# Patient Record
Sex: FEMALE | Employment: UNEMPLOYED | URBAN - METROPOLITAN AREA
[De-identification: names, ages, dates, MRNs, and addresses within clinical notes are randomized per-mention and may not be internally consistent; named-entity substitution may affect disease eponyms.]

---

## 2018-01-12 ENCOUNTER — OFFICE VISIT (OUTPATIENT)
Dept: NEUROLOGY | Age: 63
End: 2018-01-12

## 2018-01-12 VITALS
BODY MASS INDEX: 36.99 KG/M2 | DIASTOLIC BLOOD PRESSURE: 90 MMHG | WEIGHT: 222 LBS | RESPIRATION RATE: 16 BRPM | HEART RATE: 81 BPM | SYSTOLIC BLOOD PRESSURE: 180 MMHG | OXYGEN SATURATION: 99 % | HEIGHT: 65 IN

## 2018-01-12 DIAGNOSIS — F01.50 VASCULAR DEMENTIA WITHOUT BEHAVIORAL DISTURBANCE (HCC): ICD-10-CM

## 2018-01-12 DIAGNOSIS — I69.993 CVA, OLD, ATAXIA: Primary | ICD-10-CM

## 2018-01-12 DIAGNOSIS — F32.A DEPRESSION, UNSPECIFIED DEPRESSION TYPE: ICD-10-CM

## 2018-01-12 PROBLEM — F33.9 RECURRENT DEPRESSION (HCC): Status: ACTIVE | Noted: 2018-01-12

## 2018-01-12 RX ORDER — FUROSEMIDE 20 MG/1
25 TABLET ORAL DAILY
COMMUNITY
End: 2022-05-19

## 2018-01-12 RX ORDER — FLUOXETINE 10 MG/1
10 TABLET ORAL DAILY
Qty: 30 TAB | Refills: 1 | Status: SHIPPED | OUTPATIENT
Start: 2018-01-12 | End: 2018-09-24 | Stop reason: SDUPTHER

## 2018-01-12 RX ORDER — ASPIRIN 325 MG
325 TABLET ORAL DAILY
COMMUNITY

## 2018-01-12 RX ORDER — DONEPEZIL HYDROCHLORIDE 5 MG/1
5 TABLET, FILM COATED ORAL
Qty: 30 TAB | Refills: 0 | Status: SHIPPED | OUTPATIENT
Start: 2018-01-12 | End: 2018-02-06 | Stop reason: SDUPTHER

## 2018-01-12 RX ORDER — BISMUTH SUBSALICYLATE 262 MG
1 TABLET,CHEWABLE ORAL DAILY
COMMUNITY

## 2018-01-12 RX ORDER — TRAZODONE HYDROCHLORIDE 50 MG/1
TABLET ORAL
COMMUNITY
End: 2020-12-16

## 2018-01-12 RX ORDER — CARVEDILOL 25 MG/1
75 TABLET ORAL 2 TIMES DAILY WITH MEALS
COMMUNITY
End: 2022-05-19

## 2018-01-12 RX ORDER — ROSUVASTATIN CALCIUM 40 MG/1
75 TABLET, COATED ORAL
COMMUNITY
End: 2022-05-19

## 2018-01-12 RX ORDER — INSULIN ASPART 100 [IU]/ML
INJECTION, SOLUTION INTRAVENOUS; SUBCUTANEOUS
COMMUNITY

## 2018-01-12 RX ORDER — METFORMIN HYDROCHLORIDE 500 MG/1
1000 TABLET ORAL 2 TIMES DAILY WITH MEALS
COMMUNITY
End: 2022-05-19

## 2018-01-12 RX ORDER — INSULIN DEGLUDEC 100 U/ML
27 INJECTION, SOLUTION SUBCUTANEOUS DAILY
COMMUNITY
End: 2022-05-19

## 2018-01-12 NOTE — LETTER
1/12/2018 11:54 AM 
 
Patient:  Silvana Doss YOB: 1955 Date of Visit: 1/12/2018 Dear Henrietta Tinoco MD 
[address] [place] [address] VIA Facsimile: [phone] 
 : Thank you for referring Ms. Silvana Doss to me for evaluation/treatment. Below are the relevant portions of my assessment and plan of care. If you have questions, please do not hesitate to call me. I look forward to following Ms. Erick Varela along with you. Sincerely, Washington Jones MD

## 2018-01-12 NOTE — MR AVS SNAPSHOT
Visit Information Date & Time Provider Department Dept. Phone Encounter #  
 1/12/2018 10:00 AM Nilsa Zelaya MD [facility] at [address] [id number] Follow-up Instructions Return in about 6 months (around 7/12/2018). Upcoming Health Maintenance Date Due Hepatitis C Screening 1955 DTaP/Tdap/Td series (1 - Tdap) 12/2/1977 PAP AKA CERVICAL CYTOLOGY 12/2/1977 BREAST CANCER SCRN MAMMOGRAM 12/2/2006 FOBT Q 1 YEAR AGE 50-75 12/2/2006 ZOSTER VACCINE AGE 60> 10/2/2016 Influenza Age 5 to Adult 8/1/2017 Allergies as of 1/12/2018  Review Complete On: 1/12/2018 By: Nilsa Zelaya MD  
 No Known Allergies Current Immunizations  Never Reviewed No immunizations on file. Not reviewed this visit You Were Diagnosed With   
  
 Codes Comments CVA, old, ataxia    -  Primary ICD-10-CM: G84.402 ICD-9-CM: 438.84 Depression, unspecified depression type     ICD-10-CM: F32.9 ICD-9-CM: 050 Vascular dementia without behavioral disturbance     ICD-10-CM: F01.50 ICD-9-CM: 290.40 Vitals BP Pulse Resp Height(growth percentile) Weight(growth percentile) SpO2  
 180/90 81 16 5' 5\" (1.651 m) 222 lb (100.7 kg) 99% BMI OB Status Smoking Status 36.94 kg/m2 Postmenopausal Never Smoker Vitals History BMI and BSA Data Body Mass Index Body Surface Area  
 36.94 kg/m 2 2.15 m 2 Preferred Pharmacy Pharmacy Name Phone [facility] [address] [phone] Your Updated Medication List  
  
   
This list is accurate as of: 1/12/18 10:47 AM.  Always use your most recent med list.  
  
  
  
  
 aspirin 325 mg tablet Commonly known as:  ASPIRIN Take 325 mg by mouth daily. COREG 25 mg tablet Generic drug:  carvedilol Take 75 mg by mouth two (2) times daily (with meals). CRESTOR 40 mg tablet Generic drug:  rosuvastatin Take 75 mg by mouth nightly. donepezil 5 mg tablet Commonly known as:  ARICEPT Take 1 Tab by mouth nightly. FLUoxetine 10 mg tablet Commonly known as:  PROzac Take 1 Tab by mouth daily. LASIX 20 mg tablet Generic drug:  furosemide Take 25 mg by mouth daily. metFORMIN 500 mg tablet Commonly known as:  GLUCOPHAGE Take 1,000 mg by mouth two (2) times daily (with meals). multivitamin tablet Commonly known as:  ONE A DAY Take 1 Tab by mouth daily. NovoLOG 100 unit/mL injection Generic drug:  insulin aspart  
by SubCUTAneous route. potassium 99 mg tablet Take 99 mg by mouth daily. traZODone 50 mg tablet Commonly known as:  Gladystine Gemma Take  by mouth nightly. TRESIBA FLEXTOUCH U-100 100 unit/mL (3 mL) Inpn Generic drug:  insulin degludec  
by SubCUTAneous route. Prescriptions Sent to Pharmacy Refills  
 donepezil (ARICEPT) 5 mg tablet 0 Sig: Take 1 Tab by mouth nightly. Class: Normal  
 Pharmacy: [facility] GUADALUPE TRUJILLO [address] [place] [address] Ph #: [phone] Route: Oral  
 FLUoxetine (PROZAC) 10 mg tablet 1 Sig: Take 1 Tab by mouth daily. Class: Normal  
 Pharmacy: [facility] MAR[facility] GUADALUPE TRUJILLO [address] [place] [address] Ph #: [phone] Route: Oral  
  
We Performed the Following REFERRAL TO PHYSICAL THERAPY [ZTO17 Custom] Comments:  
 Please evaluate patient for residual ataxia from old stroke Follow-up Instructions Return in about 6 months (around 7/12/2018). Referral Information Referral ID Referred By Referred To  
  
 [id number] Nazanin MATUTE Not Available Visits Status Start Date End Date 1 Authorized 1/12/18 1/12/19 If your referral has a status of pending review or denied, additional information will be sent to support the outcome of this decision. Patient Instructions A Healthy Lifestyle: Care Instructions Your Care Instructions A healthy lifestyle can help you feel good, stay at a healthy weight, and have plenty of energy for both work and play. A healthy lifestyle is something you can share with your whole family. A healthy lifestyle also can lower your risk for serious health problems, such as high blood pressure, heart disease, and diabetes. You can follow a few steps listed below to improve your health and the health of your family. Follow-up care is a key part of your treatment and safety. Be sure to make and go to all appointments, and call your doctor if you are having problems. It's also a good idea to know your test results and keep a list of the medicines you take. How can you care for yourself at home? · Do not eat too much sugar, fat, or fast foods. You can still have dessert and treats now and then. The goal is moderation. · Start small to improve your eating habits. Pay attention to portion sizes, drink less juice and soda pop, and eat more fruits and vegetables. ¨ Eat a healthy amount of food. A 3-ounce serving of meat, for example, is about the size of a deck of cards. Fill the rest of your plate with vegetables and whole grains. ¨ Limit the amount of soda and sports drinks you have every day. Drink more water when you are thirsty. ¨ Eat at least 5 servings of fruits and vegetables every day. It may seem like a lot, but it is not hard to reach this goal. A serving or helping is 1 piece of fruit, 1 cup of vegetables, or 2 cups of leafy, raw vegetables. Have an apple or some carrot sticks as an afternoon snack instead of a candy bar. Try to have fruits and/or vegetables at every meal. 
· Make exercise part of your daily routine. You may want to start with simple activities, such as walking, bicycling, or slow swimming. Try to be active 30 to 60 minutes every day.  You do not need to do all 30 to 60 minutes all at once. For example, you can exercise 3 times a day for 10 or 20 minutes. Moderate exercise is safe for most people, but it is always a good idea to talk to your doctor before starting an exercise program. 
· Keep moving. Martha Varelaint the lawn, work in the garden, or meQuilibrium. Take the stairs instead of the elevator at work. · If you smoke, quit. People who smoke have an increased risk for heart attack, stroke, cancer, and other lung illnesses. Quitting is hard, but there are ways to boost your chance of quitting tobacco for good. ¨ Use nicotine gum, patches, or lozenges. ¨ Ask your doctor about stop-smoking programs and medicines. ¨ Keep trying. In addition to reducing your risk of diseases in the future, you will notice some benefits soon after you stop using tobacco. If you have shortness of breath or asthma symptoms, they will likely get better within a few weeks after you quit. · Limit how much alcohol you drink. Moderate amounts of alcohol (up to 2 drinks a day for men, 1 drink a day for women) are okay. But drinking too much can lead to liver problems, high blood pressure, and other health problems. Family health If you have a family, there are many things you can do together to improve your health. · Eat meals together as a family as often as possible. · Eat healthy foods. This includes fruits, vegetables, lean meats and dairy, and whole grains. · Include your family in your fitness plan. Most people think of activities such as jogging or tennis as the way to fitness, but there are many ways you and your family can be more active. Anything that makes you breathe hard and gets your heart pumping is exercise. Here are some tips: 
¨ Walk to do errands or to take your child to school or the bus. ¨ Go for a family bike ride after dinner instead of watching TV. Where can you learn more? Go to http://artem-humphrey.info/. Enter M374 in the search box to learn more about \"A Healthy Lifestyle: Care Instructions. \" Current as of: May 12, 2017 Content Version: 11.4 © 6995-0268 Healthwise, [employer]. Care instructions adapted under license by [employer] (which disclaims liability or warranty for this information). If you have questions about a medical condition or this instruction, always ask your healthcare professional. [address] any warranty or liability for your use of this information. Introducing [facility] [place] [facility]! [employer] introduces AutoESL patient portal. Now you can access parts of your medical record, email your doctor's office, and request medication refills online. 1. In your internet browser, go to [email or web address] 2. Click on the First Time User? Click Here link in the Sign In box. You will see the New Member Sign Up page. 3. Enter your AutoESL Access Code exactly as it appears below. You will not need to use this code after youve completed the sign-up process. If you do not sign up before the expiration date, you must request a new code. · AutoESL Access Code: [email or web address] Expires: 4/12/2018  9:58 AM 
 
4. Enter the last four digits of your Social Security Number (xxxx) and Date of Birth (mm/dd/yyyy) as indicated and click Submit. You will be taken to the next sign-up page. 5. Create a AutoESL ID. This will be your AutoESL login ID and cannot be changed, so think of one that is secure and easy to remember. 6. Create a AutoESL password. You can change your password at any time. 7. Enter your Password Reset Question and Answer. This can be used at a later time if you forget your password. 8. Enter your e-mail address. You will receive e-mail notification when new information is available in [address] 9. Click Sign Up. You can now view and download portions of your medical record. 10. Click the Download Summary menu link to download a portable copy of your medical information. If you have questions, please visit the Frequently Asked Questions section of the eCert website. Remember, eCert is NOT to be used for urgent needs. For medical emergencies, dial 911. Now available from your iPhone and Android! Please provide this summary of care documentation to your next provider. Your primary care clinician is listed as Cheri Ordonez. If you have any questions after today's visit, please call [phone].

## 2018-01-12 NOTE — PROGRESS NOTES
No chief complaint on file. HISTORY OF PRESENT ILLNESS  Alvarado Miller is a 65 y.o. female   who has seen me previously in 2009. I do not have her previous records. She states that she was seen for memory problems and was diagnosed with vascular dementia. 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 She has recovered from her weakness quite well. She has residual mild speech impediment and right-sided vision field loss. Her daughter states that her memory has been declining and she is becoming increasingly forgetful. Her long-term memory is reasonably intact. She is independent with all activities of  daily living. She is currently living with her son and is in a supervised situation all the time. She does not drive. She was on donepezil for quite some time but then ran out of the refills. She now wishes to restart the medicine and came in to reestablish care. No new issues. Daughter reports that she has been losing interest in several activities that she used to do in the past and thinks that her depression is also worsening. She is currently on trazodone at night, mainly to help her sleep. Past Medical History:   Diagnosis Date    Anxiety disorder     Cerebral artery occlusion with cerebral infarction ([facility] [address]     Coronary artery disease     Depression     Diabetes (HCC)     Hypertension     Thyroid disease      Current Outpatient Prescriptions   Medication Sig    carvedilol (COREG) 25 mg tablet Take 75 mg by mouth two (2) times daily (with meals).  metFORMIN (GLUCOPHAGE) 500 mg tablet Take 1,000 mg by mouth two (2) times daily (with meals).  insulin degludec (TRESIBA FLEXTOUCH U-100) 100 unit/mL (3 mL) inpn by SubCUTAneous route.  insulin aspart (NOVOLOG) 100 unit/mL injection by SubCUTAneous route.  traZODone (DESYREL) 50 mg tablet Take  by mouth nightly.     furosemide (LASIX) 20 mg tablet Take 25 mg by mouth daily.  potassium 99 mg tablet Take 99 mg by mouth daily.  multivitamin (ONE A DAY) tablet Take 1 Tab by mouth daily.  aspirin (ASPIRIN) 325 mg tablet Take 325 mg by mouth daily.  rosuvastatin (CRESTOR) 40 mg tablet Take 75 mg by mouth nightly.  donepezil (ARICEPT) 5 mg tablet Take 1 Tab by mouth nightly.  FLUoxetine (PROZAC) 10 mg tablet Take 1 Tab by mouth daily. No current facility-administered medications for this visit. No Known Allergies  Family History   Problem Relation Age of Onset    Heart Disease Mother      Social History   Substance Use Topics    Smoking status: Never Smoker    Smokeless tobacco: Never Used    Alcohol use No     Past Surgical History:   Procedure Laterality Date    CARDIAC SURG PROCEDURE UNLIST      HX HEENT           REVIEW OF SYSTEMS  Review of Systems - History obtained from the patient  Psychological ROS: positive for - depression  ENT ROS: negative  Hematological and Lymphatic ROS: negative  Endocrine ROS: negative  Respiratory ROS: no cough, shortness of breath, or wheezing  Cardiovascular ROS: no chest pain or dyspnea on exertion  Gastrointestinal ROS: no abdominal pain, change in bowel habits, or black or bloody stools  Genito-Urinary ROS: no dysuria, trouble voiding, or hematuria  Musculoskeletal ROS: negative  Dermatological ROS: negative      PHYSICAL EXAMINATION:    Visit Vitals    /90    Pulse 81    Resp 16    Ht 5' 5\" (1.651 m)    Wt 100.7 kg (222 lb)    SpO2 99%    BMI 36.94 kg/m2     General:  Well defined, nourished, and groomed individual in no acute distress. Neck: Supple, nontender, thyroid within normal limits, no JVD, no bruits, no pain with resistance to active range of motion. Heart: Regular rate and rhythm, no murmurs, rub, or gallop. Normal S1S2.   Lungs:  Clear to auscultation bilaterally with equal chest expansion, no cough, no wheeze  Musculoskeletal:  Extremities revealed no edema and had full range of motion of joints. Psych:  Good mood and bright affect    NEUROLOGICAL EXAMINATION:     Mental Status:   Alert and oriented to person, place, and time. She scored 16/20 on [place] cognitive assessment. She has issues with numbers, calculations, attention, fluency and delayed recall. Cranial Nerves:    II, III, IV, VI:  Visual acuity grossly intact. Visual fields are normal.    Pupils are equal, round, and reactive to light and accommodation. Extra-ocular movements are full and fluid. Fundoscopic exam was benign, no ptosis or nystagmus. V-XII: Hearing is grossly intact. Facial features are symmetric, with normal sensation and strength. The palate rises symmetrically and the tongue protrudes midline. Sternocleidomastoids 5/5. Motor Examination: Normal tone, bulk, and strength. 5/5 muscle strength throughout. No cogwheel rigidity or clonus present. Sensory exam:  Normal throughout to pinprick, temperature, and vibration sense. Normal proprioception. Coordination:  Heel-to-shin was smooth and symmetrical bilaterally. Finger to nose and rapid arm movement testing was normal.   No resting or intention tremor    Gait and Station: Mild unsteady but can walk without assistance. Normal arm swing. No Rhomberg or pronator drift. No muscle wasting or fasiculations noted. Reflexes:  DTRs 2+ throughout. Toes downgoing. ASSESSMENT    ICD-10-CM ICD-9-CM    1. CVA, old, ataxia I69.993 438.84 REFERRAL TO PHYSICAL THERAPY   2. Depression, unspecified depression type F32.9 311 FLUoxetine (PROZAC) 10 mg tablet   3. Vascular dementia without behavioral disturbance F01.50 290.40 donepezil (ARICEPT) 5 mg tablet       DISCUSSION  Ms. Levy Jimenez has a remote history of stroke with residual right-sided peripheral vision loss and mild speech impediment. The stroke likely has affected her memory as well and she has vascular dementia.    It may be reasonable to restart donepezil 5 mg daily and increase to 10 mg daily after 1 month  She is currently in a supervised situation, has a supportive family and does not drive  We will try her on fluoxetine during the day and see if it helps with her mood  She is unsteady while walking and I have suggested a course of PT for gait strengthening  Continue to follow periodically    Thank you for allowing me to participate in the care of Ms. Keith Lujan. Please feel free to contact me if you have any questions. I will be happy to follow to follow her along with you.       Adam Nation MD  Diplomate, American Board of Psychiatry & Neurology (Neurology)  [facility] Board of Psychiatry & Neurology (Clinical Neurophysiology)  Diplomate, American Board of Electrodiagnostic Medicine

## 2018-01-12 NOTE — PATIENT INSTRUCTIONS

## 2018-01-16 ENCOUNTER — TELEPHONE (OUTPATIENT)
Dept: NEUROLOGY | Age: 63
End: 2018-01-16

## 2018-01-16 NOTE — TELEPHONE ENCOUNTER
[facility] called and would like to get the last couple of visit notes for the PT Fax: [phone]

## 2018-01-18 ENCOUNTER — TELEPHONE (OUTPATIENT)
Dept: NEUROLOGY | Age: 63
End: 2018-01-18

## 2018-01-18 NOTE — TELEPHONE ENCOUNTER
Faxed only office note we have. Called and let them know that we have only seen patient once in office.

## 2018-01-18 NOTE — TELEPHONE ENCOUNTER
[facility] want the last 2 office notes before they do the MRI.     Fax: [phone]

## 2018-01-24 ENCOUNTER — TELEPHONE (OUTPATIENT)
Dept: NEUROLOGY | Age: 63
End: 2018-01-24

## 2018-01-24 NOTE — TELEPHONE ENCOUNTER
Pt has an appt today for physical therapy, has not received order please fax over order and include  patient's name, , diagnosis code and doctor's signature   Fax: [phone]

## 2018-01-27 ENCOUNTER — HOSPITAL ENCOUNTER (OUTPATIENT)
Dept: CT IMAGING | Age: 63
Discharge: HOME | End: 2018-01-27
Attending: FAMILY MEDICINE
Payer: MEDICARE

## 2018-01-27 DIAGNOSIS — R26.89 LOSS OF BALANCE: ICD-10-CM

## 2018-01-27 PROCEDURE — 70450 CT HEAD/BRAIN W/O DYE: CPT

## 2018-07-13 DIAGNOSIS — F01.50 VASCULAR DEMENTIA WITHOUT BEHAVIORAL DISTURBANCE (HCC): ICD-10-CM

## 2018-07-16 RX ORDER — DONEPEZIL HYDROCHLORIDE 10 MG/1
TABLET, FILM COATED ORAL
Qty: 30 TAB | Refills: 3 | Status: SHIPPED | OUTPATIENT
Start: 2018-07-16 | End: 2018-11-28 | Stop reason: SDUPTHER

## 2018-07-19 ENCOUNTER — TELEPHONE (OUTPATIENT)
Dept: NEUROLOGY | Age: 63
End: 2018-07-19

## 2018-07-19 NOTE — TELEPHONE ENCOUNTER
Pt is having violent outburst. This is the 3-4 time having in the last couple of months. Daughter in law is not sure what to do now. Please call back.

## 2018-07-19 NOTE — TELEPHONE ENCOUNTER
Spoke with Daughter in law, activity level has decreased, is becoming more violent, stated I would address with provider and get back to her as soon as possible

## 2018-07-20 NOTE — TELEPHONE ENCOUNTER
She could have underlying infectious or metabolic reason(s) for worsening of her violent behavior.  Would recommend getting evaluated in an ER before starting any medication or referring to a Geriatric psychiatrist

## 2018-09-24 DIAGNOSIS — F32.A DEPRESSION, UNSPECIFIED DEPRESSION TYPE: ICD-10-CM

## 2018-09-25 RX ORDER — FLUOXETINE 10 MG/1
TABLET ORAL
Qty: 30 TAB | Refills: 1 | Status: SHIPPED | OUTPATIENT
Start: 2018-09-25 | End: 2018-11-28 | Stop reason: SDUPTHER

## 2018-11-28 DIAGNOSIS — F32.A DEPRESSION, UNSPECIFIED DEPRESSION TYPE: ICD-10-CM

## 2018-11-28 DIAGNOSIS — F01.50 VASCULAR DEMENTIA WITHOUT BEHAVIORAL DISTURBANCE (HCC): ICD-10-CM

## 2018-11-28 RX ORDER — FLUOXETINE 10 MG/1
TABLET ORAL
Qty: 30 TAB | Refills: 1 | Status: SHIPPED | OUTPATIENT
Start: 2018-11-28 | End: 2019-04-08 | Stop reason: SDUPTHER

## 2018-11-28 RX ORDER — DONEPEZIL HYDROCHLORIDE 10 MG/1
TABLET, FILM COATED ORAL
Qty: 30 TAB | Refills: 3 | Status: SHIPPED | OUTPATIENT
Start: 2018-11-28 | End: 2020-12-16

## 2019-04-08 DIAGNOSIS — F32.A DEPRESSION, UNSPECIFIED DEPRESSION TYPE: ICD-10-CM

## 2019-04-08 RX ORDER — FLUOXETINE 10 MG/1
TABLET ORAL
Qty: 30 TAB | Refills: 1 | Status: SHIPPED | OUTPATIENT
Start: 2019-04-08 | End: 2020-06-02 | Stop reason: ALTCHOICE

## 2019-04-08 NOTE — TELEPHONE ENCOUNTER
Patient's daughter in-law calling for a refill of (prozac) for patient. Patient's next follow up is scheduled 07/22/19.       Best # [phone]    Requested Prescriptions     Pending Prescriptions Disp Refills    FLUoxetine (PROZAC) 10 mg tablet 30 Tab 1

## 2019-07-17 ENCOUNTER — TELEPHONE (OUTPATIENT)
Dept: NEUROLOGY | Age: 64
End: 2019-07-17

## 2019-07-17 NOTE — TELEPHONE ENCOUNTER
----- Message from [facility] sent at 7/17/2019 12:15 PM EDT -----  Regarding: Dr. Ashley Roper caregiver Talib España [phone] needs to know if we can call in a RX to [facility] [phone] for neurapathy for the pt.

## 2019-07-19 DIAGNOSIS — G62.9 NEUROPATHY: Primary | ICD-10-CM

## 2019-07-19 RX ORDER — GABAPENTIN 100 MG/1
CAPSULE ORAL
Qty: 90 CAP | Refills: 1 | Status: SHIPPED | OUTPATIENT
Start: 2019-07-19 | End: 2020-01-16 | Stop reason: DRUGHIGH

## 2019-07-19 NOTE — TELEPHONE ENCOUNTER
Spoke with daughter, she states \"the nerves in her feet hurt when she walks\". She isn't sure if it's the patient's dementia, or if she really is experiencing pain. This has been gradually getting worse which she started noticing x3 months. She wanted to know what patient should do to help the pain she may be experiencing.

## 2019-07-19 NOTE — TELEPHONE ENCOUNTER
Spoke with daughter, informed her per -She may be developing neuropathy in the feet.  I would recommend trying gabapentin.  Rx printed. Jonh Talavera schedule an EMG.    She verbalized understanding.

## 2019-07-22 ENCOUNTER — TELEPHONE (OUTPATIENT)
Dept: NEUROLOGY | Age: 64
End: 2019-07-22

## 2019-07-22 NOTE — TELEPHONE ENCOUNTER
Daughter-in-law, Jessica Comer, returning call to schedule EMG -- please advise.     Best # [phone]

## 2019-08-27 ENCOUNTER — TELEPHONE (OUTPATIENT)
Dept: NEUROLOGY | Age: 64
End: 2019-08-27

## 2019-08-27 NOTE — TELEPHONE ENCOUNTER
Sai Parks, pt's daughter in law, calling. She stated the pt has started wandering. Last night she wandered into the kitchen at 1:00am and started eating, when she was told it wasn't time to eat she became verbally aggressive. She stated this is the first time the pt has been verbally aggressive but not the first time she has wandered in the past. She would also like a call back re pt's Prozac, she doesn't feel like it is helping because they havent seen any changes.  Please call back

## 2019-08-28 NOTE — TELEPHONE ENCOUNTER
Spoke with daughter she states patient has been verbally aggressive a few times over the past 3 weeks. She hasn't really tried to redirect her, she just lets her be with whatever she is doing. No attempts to wander outside. She also has not seen any changes in mood while being on current dose of Prozac.

## 2019-08-29 RX ORDER — MEMANTINE HYDROCHLORIDE 5 MG-10 MG
KIT ORAL
Qty: 30 TAB | Refills: 0 | Status: SHIPPED | OUTPATIENT
Start: 2019-08-29 | End: 2019-10-15 | Stop reason: DRUGHIGH

## 2019-08-29 NOTE — TELEPHONE ENCOUNTER
Spoke with Kim arrington, informed her of new order per  and to D/C Prozac. Patient is scheduled for follow up in November.

## 2019-09-18 ENCOUNTER — TELEPHONE (OUTPATIENT)
Dept: NEUROLOGY | Age: 64
End: 2019-09-18

## 2019-09-18 NOTE — TELEPHONE ENCOUNTER
----- Message from Saumya Sloan sent at 9/18/2019  9:18 AM EDT -----  Regarding: Dr. Socrates Henry daughter Curtis Nicely is requesting a call back regarding pt procedure for tomorrow. Best contact [phone].

## 2019-09-19 ENCOUNTER — OFFICE VISIT (OUTPATIENT)
Dept: NEUROLOGY | Age: 64
End: 2019-09-19

## 2019-09-19 DIAGNOSIS — E11.42 DIABETIC POLYNEUROPATHY ASSOCIATED WITH TYPE 2 DIABETES MELLITUS (HCC): ICD-10-CM

## 2019-09-19 DIAGNOSIS — R27.8 SENSORY ATAXIA: Primary | ICD-10-CM

## 2019-09-19 NOTE — PROGRESS NOTES
[facility] Neurology [facility]  [address] [place] [address]  Phone [phone] Fax [phone]  Test Date:  2019    Patient: Chapo Pickering : 1955 Physician: Alka Clark MD   Sex: Female Height: 5' 5\" Ref Phys: Alka Clark MD   ID#: [id number] Weight: 222 lbs. Technician: Carlos Delgadillo. .EMG TECH     Patient Complaints:  BLE    Patient History / Exam:  29-year-old female with history of diabetes who is being evaluated for numbness and pain in both feet. She has also been noticing balance problems. On exam: Alert and fully oriented to cranial nerves II through XII intact. Muscle tone above normal strength normal in both upper and lower extremities. DTRs 1/2 in the upper extremities and knees. Ankle jerks were absent. Sensation impaired to pinprick and vibration distally in both both feet with distal to proximal gradient. Gait mild unsteady. NCV & EMG Findings:  Evaluation of the left peroneal motor and the right peroneal motor nerves showed reduced amplitude (L0.7, R1.8 mV). The left tibial motor and the right tibial motor nerves showed reduced amplitude (L1.0, R1.0 mV) and decreased conduction velocity (Knee-Ankle, L27, R28 m/s). The left Sup Peroneal sensory nerve showed no response (14 cm). The right Sup Peroneal sensory nerve showed no response (14 cm) and no response (Site 2). The left sural sensory and the right sural sensory nerves showed no response (Calf). All left vs. right side differences were within normal limits. F Wave studies indicate that the left tibial F wave has prolonged latency (67.86 ms). All remaining F Wave latencies were within normal limits. Left vs. Right comparison data for the tibial F wave indicates abnormal L-R latency difference (7.64 ms).       Needle evaluation of the left anterior tibialis, the left peroneus longus, the left gastroc, the left flexor digitorum longus, the right anterior tibialis, the right peroneus longus, the right gastroc, and the right flexor digitorum longus muscles showed increased motor unit amplitude. All remaining muscles (as indicated in the following table) showed no evidence of electrical instability. Impression:  Peroneal and tibial motor responses in both lower extremities were of low amplitude and superficial peroneal/sural sensory responses were absent bilaterally. Concentric needle EMG of selected muscles of lower extremities did not reveal any abnormal insertional or spontaneous activity. Motor units were of slightly increased size in all the muscles that were studied. The elective diagnostic testing is suggestive of a severe, distal, symmetric, length dependent, axonal type of large fiber polyneuropathy affecting both motor and sensory fibers. This could be due to the underlying diabetes.        ___________________________  Shalini Arenas MD        Nerve Conduction Studies  Anti Sensory Summary Table     Stim Site NR Peak (ms) Norm Peak (ms) P-T Amp (µV) Norm P-T Amp Onset (ms) Site1 Site2 Delta-P (ms) Dist (cm) Pj (m/s) Norm Pj (m/s)   Left Sup Peroneal Anti Sensory (Ant Lat Mall)  31.8°C   14 cm NR  <4.4  >5.0  14 cm Ant Lat Mall  10.0  >32   Right Sup Peroneal Anti Sensory (Ant Lat Mall)  33.7°C   14 cm NR  <4.4  >5.0  14 cm Ant Lat Mall  10.0  >32   Site 2 NR              Left Sural Anti Sensory (Lat Mall)  31.1°C   Calf NR  <4.0  >5.0  Calf Lat Mall  14.0  >35   Right Sural Anti Sensory (Lat Mall)  31.7°C   Calf NR  <4.0  >5.0          Site 2    7.5  87.4  0.9           Motor Summary Table     Stim Site NR Onset (ms) Norm Onset (ms) O-P Amp (mV) Norm O-P Amp Site1 Site2 Delta-0 (ms) Dist (cm) Pj (m/s) Norm Pj (m/s)   Left Peroneal Motor (Ext Dig Brev)  22.7°C   Ankle    5.0 <6.1 0.7 >2.5 B Fib Ankle 8.0 30.0 38 >38   B Fib    13.0  0.6  Poplt B Fib 2.5 10.0 40 >40   Poplt    15.5  0.5          Right Peroneal Motor (Ext Dig Brev)  32.1°C   Ankle    5.2 <6.1 1.8 >2.5 B Fib Ankle 7.4 29.0 39 >38   B Fib    12.6  1.7  Poplt B Fib 1.9 10.0 53 >40   Poplt    14.5  1.7          Left Tibial Motor (Abd Ramirez Brev)  22.7°C   Ankle    6.0 <6.1 1.0 >3.0 Knee Ankle 14.0 38.0 27 >35   Knee    20.0  0.4          Right Tibial Motor (Abd Ramirez Brev)  33.3°C   Ankle    5.2 <6.1 1.0 >3.0 Knee Ankle 13.7 38.0 28 >35   Knee    18.9  0.8            F Wave Studies     NR F-Lat (ms) Lat Norm (ms) L-R F-Lat (ms) L-R Lat Norm   Left Tibial (Mrkrs) (Abd Hallucis)  22.7°C      67.86 <61 7.64 <5.7   Right Tibial (Mrkrs) (Abd Hallucis)  33.5°C      60.23 <61 7.64 <5.7     EMG     Side Muscle Nerve Root Ins Act Fibs Psw Amp Dur Poly Recrt Int Pat Comment   Left AntTibialis Dp Br Peronel L4-5 Nml Nml Nml Incr Nml 0 Nml Nml    Left Peroneus Long Sup Br Peronel L5-S1 Nml Nml Nml Incr Nml 0 Nml Nml    Left Gastroc Tibial S1-2 Nml Nml Nml Incr Nml 0 Nml Nml    Left Flex Dig Long Tibial L5-S2 Nml Nml Nml Incr Nml 0 Nml Nml    Left VastusLat Femoral L2-4 Nml Nml Nml Nml Nml 0 Nml Nml    Right AntTibialis Dp Br Peronel L4-5 Nml Nml Nml Incr Nml 0 Nml Nml    Right Peroneus Long Sup Br Peronel L5-S1 Nml Nml Nml Incr Nml 0 Nml Nml    Right Gastroc Tibial S1-2 Nml Nml Nml Incr Nml 0 Nml Nml    Right Flex Dig Long Tibial L5-S2 Nml Nml Nml Incr Nml 0 Nml Nml    Right VastusLat Femoral L2-4 Nml Nml Nml Nml Nml 0 Nml Nml          Waveforms:

## 2019-10-09 ENCOUNTER — PREPPED CHART (OUTPATIENT)
Dept: URBAN - METROPOLITAN AREA CLINIC 98 | Facility: CLINIC | Age: 64
End: 2019-10-09

## 2019-10-09 ENCOUNTER — TELEPHONE (OUTPATIENT)
Dept: NEUROLOGY | Age: 64
End: 2019-10-09

## 2019-10-09 NOTE — TELEPHONE ENCOUNTER
Patient's daughter, Tim Ring verified, reported that patient has been requiring maximum assistance to ambulate due to being \"unable to see\". Patient saw Dr. Xander Solomon Specialist, yesterday and had vision tested-Right eye 20/60 Left eye 20/40. Patient's daughter is concerned. She stated she was unsure if patient simply enjoyed the attention she was receiving. Patient has PT referral but has not started attending yet. Please advise.

## 2019-10-09 NOTE — TELEPHONE ENCOUNTER
* Message from Lorene Burgess below:          ----- Message from Junior Quigley sent at 10/9/2019 10:38 AM EDT -----  Regarding: Dr. Nikita Sabillon would like a call back regarding her mother. Stated her mother is saying she can't see or walk by herself but her eye Dr stated the visual test is saying she can.  Would like a call back on [phone]

## 2019-10-09 NOTE — TELEPHONE ENCOUNTER
If the patient is reporting that she cannot see but the eye exam is normal, I would recommend proceeding with PT as she does have neuropathy which can cause balance problem

## 2019-10-10 ENCOUNTER — TELEPHONE (OUTPATIENT)
Dept: NEUROLOGY | Age: 64
End: 2019-10-10

## 2019-10-15 ENCOUNTER — TELEPHONE (OUTPATIENT)
Dept: NEUROLOGY | Age: 64
End: 2019-10-15

## 2019-10-15 DIAGNOSIS — F01.50 VASCULAR DEMENTIA WITHOUT BEHAVIORAL DISTURBANCE (HCC): Primary | ICD-10-CM

## 2019-10-15 RX ORDER — MEMANTINE HYDROCHLORIDE 10 MG/1
10 TABLET ORAL 2 TIMES DAILY
Qty: 60 TAB | Refills: 5 | Status: SHIPPED | OUTPATIENT
Start: 2019-10-15 | End: 2020-03-30 | Stop reason: SDUPTHER

## 2019-10-15 NOTE — TELEPHONE ENCOUNTER
----- Message from Dario Mailman sent at 10/15/2019  9:40 AM EDT -----  Regarding: Dr. Megan Kat: [phone]  Pt daughter in law Alexandria pass requesting a call back in regards to Rx \"Mamenda 10mg\". Pt has completed the starter pack and is completely out. Send refill to [address] [place] [address] [phone] or contact if Rx needs to be picked up in office. best contact [phone].

## 2020-01-15 ENCOUNTER — TELEPHONE (OUTPATIENT)
Dept: NEUROLOGY | Age: 65
End: 2020-01-15

## 2020-01-15 NOTE — TELEPHONE ENCOUNTER
Dr. Dolly Jha, patient has been using the Gabapentin you prescribed but it is no longer working. She is on a waiting list to see Rheumatology in April or May. I have scheduled her a follow up with you 1/29/20 but her daughter-in-law is asking if anything can be done prior to her appointment to help relieve her symptoms. Please advise.

## 2020-01-15 NOTE — TELEPHONE ENCOUNTER
Stated that her pain is getting worse. Stated that she is unable to function through out the day.    Request a call back from the

## 2020-01-16 DIAGNOSIS — E11.42 DIABETIC POLYNEUROPATHY ASSOCIATED WITH TYPE 2 DIABETES MELLITUS (HCC): Primary | ICD-10-CM

## 2020-01-16 RX ORDER — GABAPENTIN 300 MG/1
300 CAPSULE ORAL 3 TIMES DAILY
Qty: 90 CAP | Refills: 3 | Status: SHIPPED | OUTPATIENT
Start: 2020-01-16 | End: 2021-03-23 | Stop reason: ALTCHOICE

## 2020-01-16 NOTE — TELEPHONE ENCOUNTER
Per Dr. Naomy Baeza, She is on a very small dose of gabapentin.  I recommend that she try gabapentin 300 mg up to 3 times daily.  If it does not help, she can let us know and we can change it to something else.

## 2020-01-29 ENCOUNTER — TELEPHONE (OUTPATIENT)
Dept: NEUROLOGY | Age: 65
End: 2020-01-29

## 2020-01-29 ENCOUNTER — OFFICE VISIT (OUTPATIENT)
Dept: NEUROLOGY | Age: 65
End: 2020-01-29

## 2020-01-29 VITALS
RESPIRATION RATE: 16 BRPM | DIASTOLIC BLOOD PRESSURE: 70 MMHG | SYSTOLIC BLOOD PRESSURE: 122 MMHG | HEART RATE: 81 BPM | HEIGHT: 65 IN | OXYGEN SATURATION: 95 % | BODY MASS INDEX: 34.32 KG/M2 | WEIGHT: 206 LBS

## 2020-01-29 DIAGNOSIS — F01.50 VASCULAR DEMENTIA WITHOUT BEHAVIORAL DISTURBANCE (HCC): ICD-10-CM

## 2020-01-29 DIAGNOSIS — I69.993 CVA, OLD, ATAXIA: Primary | ICD-10-CM

## 2020-01-29 DIAGNOSIS — E11.42 DIABETIC POLYNEUROPATHY ASSOCIATED WITH TYPE 2 DIABETES MELLITUS (HCC): ICD-10-CM

## 2020-01-29 DIAGNOSIS — M79.604 PAIN IN RIGHT LEG: ICD-10-CM

## 2020-01-29 DIAGNOSIS — S09.8XXS BLUNT HEAD TRAUMA, SEQUELA: ICD-10-CM

## 2020-01-29 NOTE — TELEPHONE ENCOUNTER
I returned Lori's call. She has since decided to go through [facility] for the CT scan.

## 2020-01-29 NOTE — TELEPHONE ENCOUNTER
----- Message from Erik Rodriguez sent at 1/29/2020  3:09 PM EST -----  Regarding: Dr. Kiara Diallo would like a call back regarding a prior authorization pt to get a CT Scan.  Contact is [phone]

## 2020-01-29 NOTE — PROGRESS NOTES
Chief Complaint   Patient presents with    Neurologic Problem         HISTORY OF PRESENT ILLNESS  Pily Felix came back for follow-up. She was last seen for electrodiagnostic testing which revealed severe peripheral neuropathy, suspected to be due to diabetes. She has continued to have balance issues and these seem to be progressive. She has had a significant decline since November 2019. She has also been reporting pain in her right lower extremity and recently had peripheral blood flow study done at vascular surgery office but does not know the results. She barely walks at home and needs max assistance. Daughter-in-law thinks that wheelchair would be the only way she could be safely transported. While coming to her appointment today, she fell in the parking lot, hit her head against the pavement but did not lose consciousness. Did sustain some superficial bruising on the forehead. Her memory issues have not changed much. She needs assistance/supervision with several basic activities such as taking a shower, transfers etc. but can feed herself, dress/change her clothes. Mostly she sits around in the house without much physical activity. She does attend a  where they do some exercises but she does them sitting. RECAP  She was see by me previously in 2009. I do not have her previous records. She states that she was seen for memory problems and was diagnosed with vascular dementia. 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 She has recovered from her weakness quite well. She has residual mild speech impediment and right-sided vision field loss. Her daughter states that her memory has been declining and she is becoming increasingly forgetful. Her long-term memory is reasonably intact. She is independent with all activities of  daily living.  She is currently living with her son and is in a supervised situation all the time. She does not drive. She was on donepezil for quite some time but then ran out of the refills. She now wishes to restart the medicine and came in to reestablish care. No new issues. Daughter reports that she has been losing interest in several activities that she used to do in the past and thinks that her depression is also worsening. She is currently on trazodone at night, mainly to help her sleep. Current Outpatient Medications   Medication Sig    gabapentin (NEURONTIN) 300 mg capsule Take 1 Cap by mouth three (3) times daily. Max Daily Amount: 900 mg.  memantine (NAMENDA) 10 mg tablet Take 1 Tab by mouth two (2) times a day.  carvedilol (COREG) 25 mg tablet Take 75 mg by mouth two (2) times daily (with meals).  metFORMIN (GLUCOPHAGE) 500 mg tablet Take 1,000 mg by mouth two (2) times daily (with meals).  insulin degludec (TRESIBA FLEXTOUCH U-100) 100 unit/mL (3 mL) inpn by SubCUTAneous route.  insulin aspart (NOVOLOG) 100 unit/mL injection by SubCUTAneous route.  traZODone (DESYREL) 50 mg tablet Take  by mouth nightly.  furosemide (LASIX) 20 mg tablet Take 25 mg by mouth daily.  potassium 99 mg tablet Take 99 mg by mouth daily.  multivitamin (ONE A DAY) tablet Take 1 Tab by mouth daily.  aspirin (ASPIRIN) 325 mg tablet Take 325 mg by mouth daily.  rosuvastatin (CRESTOR) 40 mg tablet Take 75 mg by mouth nightly.  FLUoxetine (PROZAC) 10 mg tablet TAKE 1 TABLET BY MOUTH ONCE DAILY    donepezil (ARICEPT) 10 mg tablet TAKE 1 TABLET BY MOUTH ONCE DAILY AT NIGHT     No current facility-administered medications for this visit.       No Known Allergies  Family History   Problem Relation Age of Onset    Heart Disease Mother      Social History     Tobacco Use    Smoking status: Never Smoker    Smokeless tobacco: Never Used   Substance Use Topics    Alcohol use: No    Drug use: Not on file       PHYSICAL EXAMINATION:    Visit Vitals  /70   Pulse 81   Resp 16   Ht 5' 5\" (1.651 m)   Wt 93.4 kg (206 lb)   SpO2 95%   BMI 34.28 kg/m²       NEUROLOGICAL EXAMINATION:     Mental Status:   Alert and oriented to person, place, and time. She scored 16/20 on last [place] cognitive assessment. She has issues with numbers, calculations, attention, fluency and delayed recall. Cranial Nerves:    II, III, IV, VI:  Visual acuity grossly intact. Visual fields are normal.    Pupils are equal, round, and reactive to light and accommodation. Extra-ocular movements are full and fluid. V-XII: Hearing is grossly intact. Facial features are symmetric, with normal sensation and strength. The palate rises symmetrically and the tongue protrudes midline. Sternocleidomastoids 5/5. Motor Examination: Normal tone, bulk, and strength. 5/5 muscle strength throughout. No cogwheel rigidity or clonus present. Sensory exam: Impaired pinprick, light touch and vibration sense distally in both lower extremities. Impaired proprioception. Coordination:  Finger to nose and rapid arm movement testing was normal.   No resting or intention tremor    Gait and Station: Quite unsteady. Positive Romberg. No muscle wasting or fasiculations noted. Reflexes:  DTRs 2+ throughout. Toes downgoing. ASSESSMENT    ICD-10-CM ICD-9-CM    1. CVA, old, ataxia I69.993 438.84    2. Diabetic polyneuropathy associated with type 2 diabetes mellitus ([facility]) E11.42 250.60      357.2    3. Vascular dementia without behavioral disturbance ([facility]) F01.50 290.40    4. Pain in right leg M79.604 729.5 DUPLEX LOWER EXT VENOUS BILAT   5. Blunt head trauma, sequela S09. 8XXS 908.9 CT HEAD WO CONT       DISCUSSION  Ms. Nany Henderson has a remote history of stroke with residual right-sided peripheral vision loss and mild speech impediment. The stroke likely has affected her memory as well and she has vascular dementia.    She is currently on Namenda which she should continue. Could not tolerate donepezil  She is significantly decompensated with her walking ability and there has been a sudden decline since November 2019. Given her sedentary lifestyle, pain and some swelling in the distal right leg I have ordered venous Doppler to rule out DVT  If it is negative, we may need to consider aggressive PT or possibly brief inpatient rehab    Given her fall earlier today in the parking lot during which she hit her head against the pavement, I will check a CT scan as she takes dual antiplatelet therapy    I spent greater than 40 minutes with the patient and more than 50% of the time was spent in counseling and coordination of care. Ed Ospina MD  Diplomate, American Board of Psychiatry & Neurology (Neurology)  [place] Board of Psychiatry & Neurology (Clinical Neurophysiology)  Diplomate, American Board of Electrodiagnostic Medicine  This note will not be viewable in [address]

## 2020-01-29 NOTE — PROGRESS NOTES
Ms. Hood Jasso presents today to follow up balance issues. Patient fell in our parking lot while entering our facility. She hit her head and has abrasions and contusions on left face and head. Pt alert and oriented to person, place and time. Dr. Clari Del Cid examined patient immediately upon entering our clinic and neuro check was done.

## 2020-01-29 NOTE — PATIENT INSTRUCTIONS
PRESCRIPTION REFILL POLICY [facility] Statement to Patients April 1, 2014 In an effort to ensure the large volume of patient prescription refills is processed in the most efficient and expeditious manner, we are asking our patients to assist us by calling your Pharmacy for all prescription refills, this will include also your  Mail Order Pharmacy. The pharmacy will contact our office electronically to continue the refill process. Please do not wait until the last minute to call your pharmacy. We need at least 48 hours (2days) to fill prescriptions. We also encourage you to call your pharmacy before going to  your prescription to make sure it is ready. With regard to controlled substance prescription refill requests (narcotic refills) that need to be picked up at our office, we ask your cooperation by providing us with at least 72 hours (3days) notice that you will need a refill. We will not refill narcotic prescription refill requests after 4:00pm on any weekday, Monday through Thursday, or after 2:00pm on Fridays, or on the weekends. We encourage everyone to explore another way of getting your prescription refill request processed using [employer], our patient web portal through our electronic medical record system. [employer] is an efficient and effective way to communicate your medication request directly to the office and  downloadable as an bethany on your smart phone . [employer] also features a review functionality that allows you to view your medication list as well as leave messages for your physician. Are you ready to get connected? If so please review the attatched instructions or speak to any of our staff to get you set up right away! Thank you so much for your cooperation. Should you have any questions please contact our Practice Administrator. The Physicians and Staff,  [facility] Neurology [facility]

## 2020-02-13 ENCOUNTER — TELEPHONE (OUTPATIENT)
Dept: NEUROLOGY | Age: 65
End: 2020-02-13

## 2020-02-13 NOTE — TELEPHONE ENCOUNTER
Joyn Wilson from [facility] calling, she received auth for CT of head. She is scheduled for 2/20 and they have questions re auth.  Please call     Number: [id number]

## 2020-02-13 NOTE — TELEPHONE ENCOUNTER
Called Shruti to request that servicing facility to [place], [place]: [phone]    I called Dyan  back to let her know the servicing facility has been updated and to give a call back if she has any questions.      Auth # [id number]

## 2020-02-26 ENCOUNTER — TELEPHONE (OUTPATIENT)
Dept: NEUROLOGY | Age: 65
End: 2020-02-26

## 2020-02-26 NOTE — TELEPHONE ENCOUNTER
Kezia Joaquin (hipaa verified) would like a call back to make sure Dr. Sujatha El received pt's imaging that was done at [facility].  Please call

## 2020-02-28 NOTE — TELEPHONE ENCOUNTER
Per Dr. Enrique Ramos, I have not seen it yet. Fayne Number try to obtain a report and, if possible imaging

## 2020-02-28 NOTE — TELEPHONE ENCOUNTER
I left a message for patient stating we have not received imaging and to please have it forwarded to Dr. Sujatha Benson.

## 2020-03-16 ENCOUNTER — TELEPHONE (OUTPATIENT)
Dept: NEUROLOGY | Age: 65
End: 2020-03-16

## 2020-03-16 NOTE — TELEPHONE ENCOUNTER
I advised patient's daughter-in-law that we do not have these CT results. She stated CT was done outside of [facility]. She agreed to have them faxed to Dr. Gudelia Purcell for review.  (HIPPA verified)

## 2020-03-16 NOTE — TELEPHONE ENCOUNTER
----- Message from Eugenia Johnson Page sent at 3/13/2020  3:38 PM EDT -----  Regarding: Dr. Dilcia Luna Telephone  Appointment not available:     :  1955  ID numbers:  #[id number]  Caller's first and last name and relationship to patient (if not the patient): Chuckie Maddox - Daughter In [place]  Best contact number: [phone]  Preferred date and time:  Any Wednesday in April  Scheduled appointment date and time:    Reason for appointment:    Follow up appointment Dementia  Details to clarify the request: Patient's daughter in law is calling to reschedule the appointment that was scheduled on  2020 09:00 AM. She is also requesting the results of the Cat scan once available

## 2020-03-30 ENCOUNTER — TELEPHONE (OUTPATIENT)
Dept: NEUROLOGY | Age: 65
End: 2020-03-30

## 2020-03-30 DIAGNOSIS — F01.50 VASCULAR DEMENTIA WITHOUT BEHAVIORAL DISTURBANCE (HCC): ICD-10-CM

## 2020-03-30 NOTE — TELEPHONE ENCOUNTER
Results of head CT. Pt. Daughter was told the results have been sent over twice but they haven't heard from the doctor.

## 2020-03-30 NOTE — TELEPHONE ENCOUNTER
Requested Prescriptions     Pending Prescriptions Disp Refills    memantine (NAMENDA) 10 mg tablet 60 Tab 5     Sig: Take 1 Tab by mouth two (2) times a day.

## 2020-03-31 RX ORDER — MEMANTINE HYDROCHLORIDE 10 MG/1
10 TABLET ORAL 2 TIMES DAILY
Qty: 60 TAB | Refills: 5 | Status: SHIPPED | OUTPATIENT
Start: 2020-03-31 | End: 2021-09-03 | Stop reason: SDUPTHER

## 2020-03-31 NOTE — TELEPHONE ENCOUNTER
Per Dr. Shani Dupree, I do not see any results within the system are anything scanned in the media. Please try to obtain a copy from where it was done.

## 2020-03-31 NOTE — TELEPHONE ENCOUNTER
I left a message for patient's daughter in law to call back. Need to know where CT was done to obtain these records.

## 2020-04-01 NOTE — TELEPHONE ENCOUNTER
----- Message from Scottie Schulte [id number] sent at 4/1/2020 10:08 AM EDT -----  Regarding: dr Amanda Laura telephone  General Message/Vendor Calls    Caller's first and last name: Rukhsana Angel, daughter in law      Reason for call: stated the pt's CAT scan results are at [facility] [phone] and they have sent the results three times since completed       Callback required yes/no and why: yes      Best contact number(s): [phone]      Details to clarify the request:      Scottie Schulte [phone]

## 2020-04-06 ENCOUNTER — TELEPHONE (OUTPATIENT)
Dept: NEUROLOGY | Age: 65
End: 2020-04-06

## 2020-04-06 NOTE — TELEPHONE ENCOUNTER
----- Message from Kiara Young MD sent at 4/6/2020 12:45 PM EDT -----  Please inform that CT results received. It shows evidence of old stroke, nothing new.

## 2020-05-20 DIAGNOSIS — S09.8XXS BLUNT HEAD TRAUMA, SEQUELA: ICD-10-CM

## 2020-05-27 ENCOUNTER — TELEPHONE (OUTPATIENT)
Dept: NEUROLOGY | Age: 65
End: 2020-05-27

## 2020-05-27 NOTE — TELEPHONE ENCOUNTER
Pt's daughter in law calling re pt needing anxiety medication and pain management.  Please call back

## 2020-06-02 ENCOUNTER — OFFICE VISIT (OUTPATIENT)
Dept: NEUROLOGY | Age: 65
End: 2020-06-02

## 2020-06-02 VITALS — BODY MASS INDEX: 34.28 KG/M2 | HEIGHT: 65 IN

## 2020-06-02 DIAGNOSIS — F32.A DEPRESSION, UNSPECIFIED DEPRESSION TYPE: ICD-10-CM

## 2020-06-02 DIAGNOSIS — F01.50 VASCULAR DEMENTIA WITHOUT BEHAVIORAL DISTURBANCE (HCC): Primary | ICD-10-CM

## 2020-06-02 DIAGNOSIS — R27.8 SENSORY ATAXIA: ICD-10-CM

## 2020-06-02 DIAGNOSIS — F41.9 ANXIETY: ICD-10-CM

## 2020-06-02 DIAGNOSIS — I69.993 CVA, OLD, ATAXIA: ICD-10-CM

## 2020-06-02 RX ORDER — SPIRONOLACTONE 25 MG/1
TABLET ORAL DAILY
COMMUNITY
End: 2022-05-19

## 2020-06-02 RX ORDER — AMLODIPINE BESYLATE 10 MG/1
TABLET ORAL DAILY
COMMUNITY
End: 2021-03-23 | Stop reason: ALTCHOICE

## 2020-06-02 RX ORDER — INSULIN ASPART 100 [IU]/ML
14 INJECTION, SOLUTION INTRAVENOUS; SUBCUTANEOUS
COMMUNITY
End: 2022-05-19

## 2020-06-02 RX ORDER — ALPRAZOLAM 0.25 MG/1
0.25 TABLET ORAL
Qty: 30 TAB | Refills: 0 | Status: SHIPPED | OUTPATIENT
Start: 2020-06-02 | End: 2021-03-23 | Stop reason: ALTCHOICE

## 2020-06-02 RX ORDER — CLONIDINE HYDROCHLORIDE 0.2 MG/1
TABLET ORAL 2 TIMES DAILY
COMMUNITY
End: 2022-05-19

## 2020-06-02 RX ORDER — DULOXETIN HYDROCHLORIDE 60 MG/1
60 CAPSULE, DELAYED RELEASE ORAL DAILY
Qty: 30 CAP | Refills: 1 | Status: SHIPPED | OUTPATIENT
Start: 2020-06-02 | End: 2022-05-19

## 2020-06-02 RX ORDER — LANOLIN ALCOHOL/MO/W.PET/CERES
400 CREAM (GRAM) TOPICAL DAILY
COMMUNITY
End: 2022-05-19

## 2020-06-02 RX ORDER — DULOXETIN HYDROCHLORIDE 30 MG/1
30 CAPSULE, DELAYED RELEASE ORAL 2 TIMES DAILY
COMMUNITY
End: 2020-06-02 | Stop reason: DRUGHIGH

## 2020-06-02 NOTE — PROGRESS NOTES
Ms. Lesa Cagle is being evaluated for dementia. Patient's daughter in law, Nikolas Parks, reports patient is having increasing anxiety and has had panic attacks.

## 2020-06-02 NOTE — PROGRESS NOTES
Chief Complaint   Patient presents with    Dementia     This is a telemedicine visit that was performed with the originating site at [facility] and the distant site at patient's home. Verbal consent to participate in video visit was obtained. The patient was identified by name and date of birth. This visit occurred during the Coronavirus [phone]) [facility]. I discussed with the patient the nature of our telemedicine visits, that:     I would evaluate the patient and recommend diagnostics and treatments based on my assessment   Our sessions are not being recorded and that personal health information is protected   Our team would provide follow up care in person if/when the patient needs it      [facility]  Horacio Melton was evaluated over a video call today. Daughter reports that she has been stable from cognitive and functional standpoint but has been dealing with a lot of anxiety related to the ongoing coronavirus pandemic. She often gets panic attacks in the form of dry heaves, not being able to focus, slight shortness of breath and this can continue for a few minutes or up to 30 minutes. She used to be on Prozac and was recently switched over to Cymbalta. She is currently taking 30 mg daily. 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 She is currently in physical therapy which is also for her knee. She barely walks at home and needs max assistance. Uses a wheelchair    She needs assistance/supervision with several basic activities such as taking a shower, transfers etc. but can feed herself, dress/change her clothes. Mostly she sits around in the house without much physical activity. She does attend a  where they do some exercises but she does them sitting. RECAP  She was see by me previously in 2009. I do not have her previous records.   She states that she was seen for memory problems and was diagnosed with vascular dementia. 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 She has recovered from her weakness quite well. She has residual mild speech impediment and right-sided vision field loss. Her daughter states that her memory has been declining and she is becoming increasingly forgetful. Her long-term memory is reasonably intact. She is independent with all activities of  daily living. She is currently living with her son and is in a supervised situation all the time. She does not drive. She was on donepezil for quite some time but then ran out of the refills. She now wishes to restart the medicine and came in to reestablish care. No new issues. Daughter reports that she has been losing interest in several activities that she used to do in the past and thinks that her depression is also worsening. She is currently on trazodone at night, mainly to help her sleep. Current Outpatient Medications   Medication Sig    spironolactone (ALDACTONE) 25 mg tablet Take  by mouth daily.  magnesium oxide (MAG-OX) 400 mg tablet Take 400 mg by mouth daily.  cloNIDine HCL (CATAPRES) 0.2 mg tablet Take  by mouth two (2) times a day.  amLODIPine (NORVASC) 10 mg tablet Take  by mouth daily.  insulin aspart U-100 (NovoLOG U-100 Insulin aspart) 100 unit/mL injection 14 Units by SubCUTAneous route daily as needed.  ALPRAZolam (XANAX) 0.25 mg tablet Take 1 Tab by mouth daily as needed for Anxiety.  DULoxetine (CYMBALTA) 60 mg capsule Take 1 Cap by mouth daily.  memantine (NAMENDA) 10 mg tablet Take 1 Tab by mouth two (2) times a day.  carvedilol (COREG) 25 mg tablet Take 75 mg by mouth two (2) times daily (with meals).  metFORMIN (GLUCOPHAGE) 500 mg tablet Take 1,000 mg by mouth two (2) times daily (with meals).     insulin degludec (TRESIBA FLEXTOUCH U-100) 100 unit/mL (3 mL) inpn 27 Units by SubCUTAneous route daily.  insulin aspart (NOVOLOG) 100 unit/mL injection by SubCUTAneous route.  traZODone (DESYREL) 50 mg tablet Take  by mouth nightly.  furosemide (LASIX) 20 mg tablet Take 25 mg by mouth daily.  multivitamin (ONE A DAY) tablet Take 1 Tab by mouth daily.  aspirin (ASPIRIN) 325 mg tablet Take 325 mg by mouth daily.  rosuvastatin (CRESTOR) 40 mg tablet Take 75 mg by mouth nightly.  gabapentin (NEURONTIN) 300 mg capsule Take 1 Cap by mouth three (3) times daily. Max Daily Amount: 900 mg.    donepezil (ARICEPT) 10 mg tablet TAKE 1 TABLET BY MOUTH ONCE DAILY AT NIGHT    potassium 99 mg tablet Take 99 mg by mouth daily. No current facility-administered medications for this visit. No Known Allergies  Family History   Problem Relation Age of Onset    Heart Disease Mother      Social History     Tobacco Use    Smoking status: Never Smoker    Smokeless tobacco: Never Used   Substance Use Topics    Alcohol use: No    Drug use: Not on file       PHYSICAL EXAMINATION:    Visit Vitals  Ht 5' 5\" (1.651 m)   BMI 34.28 kg/m²     Due to this being a TeleHealth evaluation, many elements of the physical examination are unable to be assessed. Exam:  NEUROLOGICAL EXAM:  General: Awake, alert, oriented x 3. Her last Seun cognitive assessment score was 16/20. She has issues with numbers, calculations, attention, fluency and delayed recall. CN: EOMI, facial strength normal and symmetric, hearing is intact  Motor: AG x 4  Reflexes: deferred  Coordination: No ataxia. Sensation: LT intact throughout  Gait: Steady    IMAGING  Last CT brain did not show anything acute. There was evidence of old infarction and white matter ischemic change    ASSESSMENT    ICD-10-CM ICD-9-CM    1. Vascular dementia without behavioral disturbance (HCC) F01.50 290.40    2. CVA, old, ataxia I69.993 438.84    3. Sensory ataxia R27.8 781.3    4.  Depression, unspecified depression type F32.9 311 DULoxetine (CYMBALTA) 60 mg capsule   5. Anxiety F41.9 300.00 ALPRAZolam (XANAX) 0.25 mg tablet      DULoxetine (CYMBALTA) 60 mg capsule       DISCUSSION  Ms. Obrien Current has a remote history of stroke with residual right-sided peripheral vision loss and mild speech impediment. The stroke likely has affected her memory as well and she has vascular dementia. She is currently on Namenda which she should continue. Could not tolerate donepezil  Seems to be dealing with anxiety/panic attacks. May use low-dose alprazolam on an as-needed basis  Increase Cymbalta to 60 mg daily. This should also help with neuropathic symptoms  Continue PT      Martita Luna MD  Diplomate, American Board of Psychiatry & Neurology (Neurology)  [place] Board of Psychiatry & Neurology (Clinical Neurophysiology)  Diplomate, American Board of Electrodiagnostic Medicine  This note will not be viewable in [address]

## 2020-06-18 ENCOUNTER — TELEPHONE (OUTPATIENT)
Dept: NEUROLOGY | Age: 65
End: 2020-06-18

## 2020-06-22 ENCOUNTER — TELEPHONE (OUTPATIENT)
Dept: NEUROLOGY | Age: 65
End: 2020-06-22

## 2020-06-22 NOTE — TELEPHONE ENCOUNTER
Judge Chavez (hipaa verified) calling stating the Xanax that Dr. Annette Moreau prescribed is working for pt's anxiety but they have been seeing a lot of agitation towards grandchildren.  Please call back

## 2020-06-23 ENCOUNTER — DOCUMENTATION ONLY (OUTPATIENT)
Dept: NEUROLOGY | Age: 65
End: 2020-06-23

## 2020-06-23 DIAGNOSIS — R45.1 AGITATION: ICD-10-CM

## 2020-06-23 DIAGNOSIS — F01.50 VASCULAR DEMENTIA WITHOUT BEHAVIORAL DISTURBANCE (HCC): Primary | ICD-10-CM

## 2020-06-23 RX ORDER — OLANZAPINE 2.5 MG/1
2.5 TABLET ORAL
Qty: 30 TAB | Refills: 0 | Status: SHIPPED | OUTPATIENT
Start: 2020-06-23 | End: 2020-07-28 | Stop reason: DRUGHIGH

## 2020-06-23 NOTE — PROGRESS NOTES
Patient recently had a CT scan of the brain in [address] [place] [address]. It did not show anything acute. Chronic infarction noted in the left occipital lobe and posterior left parietal lobe. There was a small chronic infarct within the left thalamus. There was also nonspecific white matter ischemic change.

## 2020-06-24 ENCOUNTER — TELEPHONE (OUTPATIENT)
Dept: NEUROLOGY | Age: 65
End: 2020-06-24

## 2020-06-24 NOTE — TELEPHONE ENCOUNTER
Per Dr. Prashanth Ferro, Please advise to try olanzapine 2.5 mg daily to help control the agitation.  They may hold off on alprazolam or may only use it on an as-needed basis for anxiety.    May schedule a VV to discuss, if needed

## 2020-06-24 NOTE — TELEPHONE ENCOUNTER
Cheri calling, pt coming in on Saturday for ultrasound of lower extremities. Need a signed physicians order. Please call.

## 2020-06-30 NOTE — TELEPHONE ENCOUNTER
I reviewed this information with patient's daughter. She expressed understanding and agreed to this plan.

## 2020-07-24 ENCOUNTER — TELEPHONE (OUTPATIENT)
Dept: NEUROLOGY | Age: 65
End: 2020-07-24

## 2020-07-24 NOTE — TELEPHONE ENCOUNTER
I left a message for patient to call back. I need to know which medication and which pharmacy she needs her medication sent to.

## 2020-07-28 ENCOUNTER — TELEPHONE (OUTPATIENT)
Dept: NEUROLOGY | Age: 65
End: 2020-07-28

## 2020-07-28 DIAGNOSIS — F01.50 VASCULAR DEMENTIA WITHOUT BEHAVIORAL DISTURBANCE (HCC): Primary | ICD-10-CM

## 2020-07-28 RX ORDER — OLANZAPINE 5 MG/1
5 TABLET ORAL
Qty: 30 TAB | Refills: 1 | Status: SHIPPED | OUTPATIENT
Start: 2020-07-28 | End: 2020-12-16

## 2020-07-28 NOTE — TELEPHONE ENCOUNTER
Pt is out of last medication Dr. Mallory Silva put her on but they haven't seen any change while she was on it. The aggression and agitation is still there.

## 2020-08-17 ENCOUNTER — TELEPHONE (OUTPATIENT)
Dept: NEUROLOGY | Facility: CLINIC | Age: 65
End: 2020-08-17

## 2020-08-17 NOTE — TELEPHONE ENCOUNTER
Patient cannot do a virtual visit until Wed and Kaylie Luong will not be in the office. I scheduled an in office appt for next Wednesday, per patient's daughter-in-law's request. Patient is having difficulty ambulating. I advised go to ED if symptoms do not improve. Daughter-in-law agreed to this plan.

## 2020-08-17 NOTE — TELEPHONE ENCOUNTER
Lori calling stating pt is not able to walk like she used to. She said the pt waits until someone is holding onto her until she will start walking. She is requesting a call back because she is concerned.  Please call

## 2020-08-17 NOTE — TELEPHONE ENCOUNTER
Pt's daughter in law calling stating Wednesday is the only day they can bring the pt in. She is wondering if the appt can be over the phone. Please advise.

## 2020-10-27 ENCOUNTER — TELEPHONE (OUTPATIENT)
Dept: NEUROLOGY | Facility: CLINIC | Age: 65
End: 2020-10-27

## 2020-10-27 NOTE — TELEPHONE ENCOUNTER
Pt's daughter in law calling because the pt has been very agitated. She would like to discuss medications. Please call back    She also stated pt has had several images done not within [facility].

## 2020-11-19 ENCOUNTER — TELEPHONE (OUTPATIENT)
Dept: NEUROLOGY | Age: 65
End: 2020-11-19

## 2020-11-19 NOTE — TELEPHONE ENCOUNTER
Pt's daughter in law calling re having to reschedule pt's appt. She said they wont be able to do 11/27 because she needs a Wednesday. Please advise.

## 2020-12-16 ENCOUNTER — OFFICE VISIT (OUTPATIENT)
Dept: NEUROLOGY | Age: 65
End: 2020-12-16
Payer: MEDICARE

## 2020-12-16 VITALS
DIASTOLIC BLOOD PRESSURE: 72 MMHG | HEART RATE: 70 BPM | HEIGHT: 65 IN | SYSTOLIC BLOOD PRESSURE: 160 MMHG | BODY MASS INDEX: 34.32 KG/M2 | RESPIRATION RATE: 16 BRPM | WEIGHT: 206 LBS | OXYGEN SATURATION: 98 %

## 2020-12-16 DIAGNOSIS — E11.42 DIABETIC POLYNEUROPATHY ASSOCIATED WITH TYPE 2 DIABETES MELLITUS (HCC): ICD-10-CM

## 2020-12-16 DIAGNOSIS — F41.9 ANXIETY: ICD-10-CM

## 2020-12-16 DIAGNOSIS — I69.993 CVA, OLD, ATAXIA: ICD-10-CM

## 2020-12-16 DIAGNOSIS — F91.9 BEHAVIOR DISTURBANCE: ICD-10-CM

## 2020-12-16 DIAGNOSIS — F01.50 VASCULAR DEMENTIA WITHOUT BEHAVIORAL DISTURBANCE (HCC): Primary | ICD-10-CM

## 2020-12-16 DIAGNOSIS — R47.81 SLURRED SPEECH: ICD-10-CM

## 2020-12-16 PROCEDURE — 3046F HEMOGLOBIN A1C LEVEL >9.0%: CPT | Performed by: PSYCHIATRY & NEUROLOGY

## 2020-12-16 PROCEDURE — G8417 CALC BMI ABV UP PARAM F/U: HCPCS | Performed by: PSYCHIATRY & NEUROLOGY

## 2020-12-16 PROCEDURE — 3017F COLORECTAL CA SCREEN DOC REV: CPT | Performed by: PSYCHIATRY & NEUROLOGY

## 2020-12-16 PROCEDURE — G9717 DOC PT DX DEP/BP F/U NT REQ: HCPCS | Performed by: PSYCHIATRY & NEUROLOGY

## 2020-12-16 PROCEDURE — 99215 OFFICE O/P EST HI 40 MIN: CPT | Performed by: PSYCHIATRY & NEUROLOGY

## 2020-12-16 PROCEDURE — 2022F DILAT RTA XM EVC RTNOPTHY: CPT | Performed by: PSYCHIATRY & NEUROLOGY

## 2020-12-16 PROCEDURE — G8427 DOCREV CUR MEDS BY ELIG CLIN: HCPCS | Performed by: PSYCHIATRY & NEUROLOGY

## 2020-12-16 RX ORDER — HYDROCODONE BITARTRATE AND ACETAMINOPHEN 10; 325 MG/1; MG/1
1 TABLET ORAL
COMMUNITY
End: 2022-05-19

## 2020-12-16 RX ORDER — DONEPEZIL HYDROCHLORIDE 5 MG/1
5 TABLET, FILM COATED ORAL
Qty: 30 TAB | Refills: 0 | Status: SHIPPED | OUTPATIENT
Start: 2020-12-16 | End: 2021-03-15

## 2020-12-16 RX ORDER — QUETIAPINE FUMARATE 50 MG/1
50 TABLET, FILM COATED ORAL
Qty: 30 TAB | Refills: 2 | Status: SHIPPED | OUTPATIENT
Start: 2020-12-16 | End: 2021-04-08 | Stop reason: SDUPTHER

## 2020-12-16 NOTE — PATIENT INSTRUCTIONS
PRESCRIPTION REFILL POLICY [facility] Statement to Patients April 1, 2014 In an effort to ensure the large volume of patient prescription refills is processed in the most efficient and expeditious manner, we are asking our patients to assist us by calling your Pharmacy for all prescription refills, this will include also your  Mail Order Pharmacy. The pharmacy will contact our office electronically to continue the refill process. Please do not wait until the last minute to call your pharmacy. We need at least 48 hours (2days) to fill prescriptions. We also encourage you to call your pharmacy before going to  your prescription to make sure it is ready. With regard to controlled substance prescription refill requests (narcotic refills) that need to be picked up at our office, we ask your cooperation by providing us with at least 72 hours (3days) notice that you will need a refill. We will not refill narcotic prescription refill requests after 4:00pm on any weekday, Monday through Thursday, or after 2:00pm on Fridays, or on the weekends. We encourage everyone to explore another way of getting your prescription refill request processed using [employer], our patient web portal through our electronic medical record system. [employer] is an efficient and effective way to communicate your medication request directly to the office and  downloadable as an bethany on your smart phone . [employer] also features a review functionality that allows you to view your medication list as well as leave messages for your physician. Are you ready to get connected? If so please review the attatched instructions or speak to any of our staff to get you set up right away! Thank you so much for your cooperation. Should you have any questions please contact our Practice Administrator. The Physicians and Staff,  [facility]

## 2020-12-16 NOTE — PROGRESS NOTES
Chief Complaint   Patient presents with    Dementia     HISTORY OF PRESENT ILLNESS  Rosy Lakhani came back for follow-up today. She came along with her daughter-in-law. Over the past year she has been dealing with multiple health issues. She has been experiencing a lot of back pain and was found to have L1 compression fracture and significant foraminal narrowing at L4-5. She is seeing pain management and this is being managed conservatively medications. Taking Lortab as needed. She had lesions in the bone and is awaiting pathology results. She is also being worked up for colon cancer. She attends  center during the day and the staff from there often reports that she has had a childlike behavior often gets restless and panicky. Wanders around at night and olanzapine is not helping. Still independent with basic activities but needs assistance with shower. Ambulation has been affected because of low back pain, peripheral neuropathy, knee arthritis. Can walk some at home but outside the house she needs maximum assistance or wheelchair. She has been sleeping her speech quite a bit which is a change over the last several weeks. RECAP  She has been diagnosed with vascular dementia. 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 She has recovered from her weakness quite well. She has residual mild speech impediment and right-sided vision field loss. Her daughter states that her memory has been declining and she is becoming increasingly forgetful. Her long-term memory is reasonably intact. She is independent with all activities of  daily living. She is currently living with her son and is in a supervised situation all the time. She does not drive. She was on donepezil for quite some time but then ran out of the refills.   She is currently on trazodone at night, mainly to help her sleep.      Current Outpatient Medications   Medication Sig    HYDROcodone-acetaminophen (NORCO)  mg tablet Take 1 Tab by mouth every six (6) hours as needed for Pain.  QUEtiapine (SEROquel) 50 mg tablet Take 1 Tab by mouth nightly.  donepeziL (ARICEPT) 5 mg tablet Take 1 Tab by mouth nightly.  spironolactone (ALDACTONE) 25 mg tablet Take  by mouth daily.  magnesium oxide (MAG-OX) 400 mg tablet Take 400 mg by mouth daily.  cloNIDine HCL (CATAPRES) 0.2 mg tablet Take  by mouth two (2) times a day.  amLODIPine (NORVASC) 10 mg tablet Take  by mouth daily.  insulin aspart U-100 (NovoLOG U-100 Insulin aspart) 100 unit/mL injection 14 Units by SubCUTAneous route daily as needed.  ALPRAZolam (XANAX) 0.25 mg tablet Take 1 Tab by mouth daily as needed for Anxiety.  DULoxetine (CYMBALTA) 60 mg capsule Take 1 Cap by mouth daily.  memantine (NAMENDA) 10 mg tablet Take 1 Tab by mouth two (2) times a day.  gabapentin (NEURONTIN) 300 mg capsule Take 1 Cap by mouth three (3) times daily. Max Daily Amount: 900 mg.  carvedilol (COREG) 25 mg tablet Take 75 mg by mouth two (2) times daily (with meals).  metFORMIN (GLUCOPHAGE) 500 mg tablet Take 1,000 mg by mouth two (2) times daily (with meals).  insulin degludec (TRESIBA FLEXTOUCH U-100) 100 unit/mL (3 mL) inpn 27 Units by SubCUTAneous route daily.  insulin aspart (NOVOLOG) 100 unit/mL injection by SubCUTAneous route.  furosemide (LASIX) 20 mg tablet Take 25 mg by mouth daily.  potassium 99 mg tablet Take 99 mg by mouth daily.  multivitamin (ONE A DAY) tablet Take 1 Tab by mouth daily.  aspirin (ASPIRIN) 325 mg tablet Take 325 mg by mouth daily.  rosuvastatin (CRESTOR) 40 mg tablet Take 75 mg by mouth nightly. No current facility-administered medications for this visit.       No Known Allergies  Family History   Problem Relation Age of Onset    Heart Disease Mother      Social History     Tobacco Use    Smoking status: Never Smoker    Smokeless tobacco: Never Used   Substance Use Topics    Alcohol use: No    Drug use: Not on file       PHYSICAL EXAMINATION:    Visit Vitals  BP (!) 160/72   Pulse 70   Resp 16   Ht 5' 5\" (1.651 m)   Wt 206 lb (93.4 kg)   SpO2 98%   BMI 34.28 kg/m²     Exam:  NEUROLOGICAL EXAM:  General: Awake, alert, oriented x 3. Her Seun cognitive assessment score is 13/20. She has issues with visuospatial function, executive function, numbers, calculations, attention, fluency and delayed recall. CN: EOMI, facial strength normal and symmetric, hearing is intact  Motor: Able to move all extremities, antigravity strength present   Reflexes: deferred  Coordination: No ataxia. Sensation: LT intact throughout  Gait: Unsteady, 0n a wheelchair    IMAGING  Last CT brain did not show anything acute. There was evidence of old infarction and white matter ischemic change  MRI of the lumbar spine images reviewed and results as discussed above    ASSESSMENT    ICD-10-CM ICD-9-CM    1. Vascular dementia without behavioral disturbance (HCC)  F01.50 290.40 QUEtiapine (SEROquel) 50 mg tablet      donepeziL (ARICEPT) 5 mg tablet   2. CVA, old, ataxia  I69.993 438.84    3. Diabetic polyneuropathy associated with type 2 diabetes mellitus (HCC)  E11.42 250.60      357.2    4. Anxiety  F41.9 300.00    5. Slurred speech  R47.81 784. 61 MRI BRAIN WO CONT   6. Behavior disturbance  F91.9 312.9 QUEtiapine (SEROquel) 50 mg tablet       DISCUSSION  Ms. Larrie Denver has a remote history of stroke with residual right-sided peripheral vision loss and mild speech impediment. The stroke likely has affected her memory as well and she has vascular dementia.    She has had a significant change in her speech and was somewhat difficult to understand today which is a change  I have recommended MRI scan of the brain to rule out any new ischemic infarction    She has moderate to severe dementia  Restart donepezil and continue Ivan  She is currently living at home and has a very supportive family, attends  during the day, does not drive  We'll try her on Seroquel 25-50 mg at bedtime.   May discontinue trazodone and olanzapine  Continue Cymbalta    Follow-up in 3 to 4 months    Glen Ramirez MD  Diplomate, American Board of Psychiatry & Neurology (Neurology)  [place] Board of Psychiatry & Neurology (Clinical Neurophysiology)  Diplomate, American Board of Electrodiagnostic Medicine

## 2020-12-29 ENCOUNTER — TELEPHONE (OUTPATIENT)
Dept: NEUROLOGY | Age: 65
End: 2020-12-29

## 2020-12-29 NOTE — TELEPHONE ENCOUNTER
[facility], she is requesting that she call back as she is currently in a doctor's appointment for herself.

## 2021-02-15 ENCOUNTER — TELEPHONE (OUTPATIENT)
Dept: NEUROLOGY | Age: 66
End: 2021-02-15

## 2021-02-15 NOTE — TELEPHONE ENCOUNTER
----- Message from Waqas Broderick sent at 2/15/2021  8:09 AM EST -----  Regarding: Dr. Allie Corrigan 1/Escalated Issue      Caller's first and last name and relationship (if not the patient): Mer All daughter in law      Best contact number(s): [phone]      What are the symptoms: Weakness in legs cannot get to a standing position was doing her normal routine and fell and cannot get to a standing position.       Transfer successful - yes/no (include outcome):No      Transfer declined - yes/no (include reason):no      Was caller advised to seek appropriate level of care - yes/no: yes      Details to clarify the request:        Waqas Broderick

## 2021-02-15 NOTE — TELEPHONE ENCOUNTER
Patient is currently in [facility] ED for evaluation. Patient's daughter-in-law agreed to schedule a hospital follow up with Dr. Deandre Hleler or NP once patient is discharged.

## 2021-03-16 ENCOUNTER — TELEPHONE (OUTPATIENT)
Dept: NEUROLOGY | Age: 66
End: 2021-03-16

## 2021-03-16 NOTE — TELEPHONE ENCOUNTER
----- Message from Jcarlos Deal sent at 3/16/2021  4:16 PM EDT -----  Regarding: Dr. Agueda Saleh first and last name: Joseph Sue (daughter in law)      Reason for call: update on pt's health      Callback required yes/no and why: Yes      Best contact number(s): [phone]      Details to clarify the request: Pt's daughter in law is calling regarding pt's current state. She said they are noticing an increase in depression. She says her activity levels have decreased. She isn't sure if the Cymbalta is working anymore or what it could be. She sleeps a lot more and has become less social. Aidee Reed said they started noticing this change about a month ago. Pt is with a care center during the day and it was mentioned by the staff as well. She says she also seems easily irritated.       Jcarlos Deal

## 2021-03-16 NOTE — TELEPHONE ENCOUNTER
I advised patient's daughter- in-law that she needs to discuss this with provider at upcoming visit. She agreed to this plan.

## 2021-03-23 ENCOUNTER — OFFICE VISIT (OUTPATIENT)
Dept: NEUROLOGY | Age: 66
End: 2021-03-23
Payer: MEDICARE

## 2021-03-23 VITALS
SYSTOLIC BLOOD PRESSURE: 114 MMHG | DIASTOLIC BLOOD PRESSURE: 58 MMHG | OXYGEN SATURATION: 98 % | RESPIRATION RATE: 18 BRPM | HEART RATE: 74 BPM

## 2021-03-23 DIAGNOSIS — F41.9 ANXIETY: ICD-10-CM

## 2021-03-23 DIAGNOSIS — I69.993 CVA, OLD, ATAXIA: Primary | ICD-10-CM

## 2021-03-23 DIAGNOSIS — F01.50 VASCULAR DEMENTIA WITHOUT BEHAVIORAL DISTURBANCE (HCC): ICD-10-CM

## 2021-03-23 DIAGNOSIS — F32.A DEPRESSION, UNSPECIFIED DEPRESSION TYPE: ICD-10-CM

## 2021-03-23 DIAGNOSIS — E11.42 DIABETIC POLYNEUROPATHY ASSOCIATED WITH TYPE 2 DIABETES MELLITUS (HCC): ICD-10-CM

## 2021-03-23 PROCEDURE — 3046F HEMOGLOBIN A1C LEVEL >9.0%: CPT | Performed by: PSYCHIATRY & NEUROLOGY

## 2021-03-23 PROCEDURE — 99214 OFFICE O/P EST MOD 30 MIN: CPT | Performed by: PSYCHIATRY & NEUROLOGY

## 2021-03-23 PROCEDURE — G8417 CALC BMI ABV UP PARAM F/U: HCPCS | Performed by: PSYCHIATRY & NEUROLOGY

## 2021-03-23 PROCEDURE — 3017F COLORECTAL CA SCREEN DOC REV: CPT | Performed by: PSYCHIATRY & NEUROLOGY

## 2021-03-23 PROCEDURE — 2022F DILAT RTA XM EVC RTNOPTHY: CPT | Performed by: PSYCHIATRY & NEUROLOGY

## 2021-03-23 PROCEDURE — G9717 DOC PT DX DEP/BP F/U NT REQ: HCPCS | Performed by: PSYCHIATRY & NEUROLOGY

## 2021-03-23 PROCEDURE — G8427 DOCREV CUR MEDS BY ELIG CLIN: HCPCS | Performed by: PSYCHIATRY & NEUROLOGY

## 2021-03-23 RX ORDER — PAROXETINE HYDROCHLORIDE 20 MG/1
20 TABLET, FILM COATED ORAL DAILY
Qty: 30 TAB | Refills: 1 | Status: SHIPPED | OUTPATIENT
Start: 2021-03-23 | End: 2022-04-10

## 2021-03-23 NOTE — PATIENT INSTRUCTIONS
PRESCRIPTION REFILL POLICY [facility] Statement to Patients April 1, 2014 In an effort to ensure the large volume of patient prescription refills is processed in the most efficient and expeditious manner, we are asking our patients to assist us by calling your Pharmacy for all prescription refills, this will include also your  Mail Order Pharmacy. The pharmacy will contact our office electronically to continue the refill process. Please do not wait until the last minute to call your pharmacy. We need at least 48 hours (2days) to fill prescriptions. We also encourage you to call your pharmacy before going to  your prescription to make sure it is ready. With regard to controlled substance prescription refill requests (narcotic refills) that need to be picked up at our office, we ask your cooperation by providing us with at least 72 hours (3days) notice that you will need a refill. We will not refill narcotic prescription refill requests after 4:00pm on any weekday, Monday through Thursday, or after 2:00pm on Fridays, or on the weekends. We encourage everyone to explore another way of getting your prescription refill request processed using [employer], our patient web portal through our electronic medical record system. [employer] is an efficient and effective way to communicate your medication request directly to the office and  downloadable as an bethany on your smart phone . [employer] also features a review functionality that allows you to view your medication list as well as leave messages for your physician. Are you ready to get connected? If so please review the attatched instructions or speak to any of our staff to get you set up right away! Thank you so much for your cooperation. Should you have any questions please contact our Practice Administrator. The Physicians and Staff,  [facility]

## 2021-03-23 NOTE — PROGRESS NOTES
No chief complaint on file. HISTORY OF PRESENT ILLNESS  Paola Carter came back for follow-up today. She came along with her daughter-in-law. She recently had an episode where she was not talking right and was very unsteady on her feet. She was hospitalized [facility] and had a TIA/stroke work-up which was negative. She is now on hydrocodone to control pain related to compression fractures in the spine. Not taking alprazolam anymore but is on duloxetine to help with depression. Her mood swings still persist and she gets anxiety/panic attacks at times. Does attend  and does activities but they are getting concerned about for anxiety symptoms. She has often has a childlike behavior and gets restless. Continues to take donepezil and memantine. Not on olanzapine anymore but is taking Seroquel. She has had wandering episodes at night. RECAP  She has been diagnosed with vascular dementia. 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 She has recovered from her weakness quite well. She has residual mild speech impediment and right-sided vision field loss. Her daughter states that her memory has been declining and she is becoming increasingly forgetful. Her long-term memory is reasonably intact. She is independent with all activities of  daily living. She is currently living with her son and is in a supervised situation all the time. She does not drive. Ambulation has been affected because of low back pain, peripheral neuropathy and knee arthritis. She can walk some at home but outside the house she needs maximum assistance or wheelchair. Current Outpatient Medications   Medication Sig    PARoxetine (PAXIL) 20 mg tablet Take 1 Tab by mouth daily.     donepeziL (ARICEPT) 5 mg tablet TAKE 1 TABLET BY MOUTH ONCE DAILY AT NIGHT    HYDROcodone-acetaminophen ([address])  mg tablet Take 1 Tab by mouth every six (6) hours as needed for Pain.  QUEtiapine (SEROquel) 50 mg tablet Take 1 Tab by mouth nightly.  spironolactone (ALDACTONE) 25 mg tablet Take  by mouth daily.  cloNIDine HCL (CATAPRES) 0.2 mg tablet Take  by mouth two (2) times a day.  insulin aspart U-100 (NovoLOG U-100 Insulin aspart) 100 unit/mL injection 14 Units by SubCUTAneous route daily as needed.  DULoxetine (CYMBALTA) 60 mg capsule Take 1 Cap by mouth daily.  memantine (NAMENDA) 10 mg tablet Take 1 Tab by mouth two (2) times a day.  carvedilol (COREG) 25 mg tablet Take 75 mg by mouth two (2) times daily (with meals).  metFORMIN (GLUCOPHAGE) 500 mg tablet Take 1,000 mg by mouth two (2) times daily (with meals).  insulin degludec (TRESIBA FLEXTOUCH U-100) 100 unit/mL (3 mL) inpn 27 Units by SubCUTAneous route daily.  insulin aspart (NOVOLOG) 100 unit/mL injection by SubCUTAneous route.  furosemide (LASIX) 20 mg tablet Take 25 mg by mouth daily.  multivitamin (ONE A DAY) tablet Take 1 Tab by mouth daily.  aspirin (ASPIRIN) 325 mg tablet Take 325 mg by mouth daily.  rosuvastatin (CRESTOR) 40 mg tablet Take 75 mg by mouth nightly.  magnesium oxide (MAG-OX) 400 mg tablet Take 400 mg by mouth daily. No current facility-administered medications for this visit. PHYSICAL EXAMINATION:    Visit Vitals  BP (!) 114/58   Pulse 74   Resp 18   SpO2 98%     Exam:  NEUROLOGICAL EXAM:  General: Awake, alert, oriented x 3. Her last Seun cognitive assessment score was 13/20. She has issues with visuospatial function, executive function, numbers, calculations, attention, fluency and delayed recall. CN: EOMI, facial strength normal and symmetric, hearing is intact  Motor: Able to move all extremities, antigravity strength present   Reflexes: deferred  Coordination: No ataxia.   Sensation: LT intact throughout  Gait: Unsteady, 0n a wheelchair    IMAGING  Last CT brain did not show anything acute. There was evidence of old infarction and white matter ischemic change  MRI of the lumbar spine as discussed above    MRI scan of the brain done recently at [facility] did not show any change in her daughter-in-law    ASSESSMENT    ICD-10-CM ICD-9-CM    1. CVA, old, ataxia  I69.993 438.84    2. Vascular dementia without behavioral disturbance (HCC)  F01.50 290.40    3. Anxiety  F41.9 300.00 PARoxetine (PAXIL) 20 mg tablet   4. Diabetic polyneuropathy associated with type 2 diabetes mellitus (HCC)  E11.42 250.60      357.2    5. Depression, unspecified depression type  F32.9 311 PARoxetine (PAXIL) 20 mg tablet       DISCUSSION  Ms. Obrien Current has a remote history of stroke with residual right-sided peripheral vision loss and mild speech impediment. The stroke likely has affected her memory as well and she has vascular dementia. Recently she was hospitalized at [facility] for symptoms of difficulty with talking and unsteady gait. She had MRI scan of the brain which did not show anything new. I am wondering if this could have been a side effect of multiple types of medications she takes  Continue aspirin and Crestor    Her dementia is moderate to severe. Continue donepezil 10 Namenda.   She is currently living at home and has a very supportive family, attends  during the day, does not drive  We will try adding paroxetine 20 mg to help her anxiety, restlessness and panic attack type states  Continue Cymbalta  Seroquel at bedtime to help her sleep and avoid nocturnal wandering  Follow-up in 3 to 4 months    Martita Luna MD  Diplomate, American Board of Psychiatry & Neurology (Neurology)  Diplomate, American Board of Psychiatry & Neurology (Clinical Neurophysiology)  Diplomate, American Board of Electrodiagnostic Medicine

## 2021-04-08 DIAGNOSIS — F01.50 VASCULAR DEMENTIA WITHOUT BEHAVIORAL DISTURBANCE (HCC): ICD-10-CM

## 2021-04-08 DIAGNOSIS — F91.9 BEHAVIOR DISTURBANCE: ICD-10-CM

## 2021-04-08 NOTE — TELEPHONE ENCOUNTER
----- Message from [employer] sent at 4/8/2021  1:49 PM EDT -----  Regarding: Dr. Matthew Moreau first and last name: Leta Murheriberto- daughter in law      Reason for call: update on pt's health      Callback required yes/no and why: Yes      Best contact number(s): [phone]      Details to clarify the request: Ms. Justin Montejo is calling to notify the office that pt has been acting very different lately. She has noticed that if the patient comes into the living room to sit down, she doesn't get up for anything that she needs. She said once she sits, she doesn't get up for anything else that she needs. They have also noticed that she holds onto the wall to keep herself up.        [employer]

## 2021-04-08 NOTE — TELEPHONE ENCOUNTER
----- Message from [employer] sent at 4/8/2021  1:53 PM EDT -----  Regarding: Dr. Antwan Millan (if not patient): Leta Lambert       Relationship of caller (if not patient): daughter in law      Best contact number(s): [phone]      Name of medication and dosage if known: QUEtiapine (SEROquel) 50 mg tablet       Is patient out of this medication (yes/no): Yes      Pharmacy name: [address] listed in chart? (yes/no): Yes  Pharmacy phone number: [phone]       Details to clarify the request: Pt's daughter in law is requesting a refill of her Seroquel. Please advise.       WHOOP

## 2021-04-09 RX ORDER — QUETIAPINE FUMARATE 50 MG/1
50 TABLET, FILM COATED ORAL
Qty: 30 TAB | Refills: 0 | Status: SHIPPED | OUTPATIENT
Start: 2021-04-09 | End: 2021-07-01 | Stop reason: SDUPTHER

## 2021-06-24 ENCOUNTER — TELEPHONE (OUTPATIENT)
Dept: NEUROLOGY | Age: 66
End: 2021-06-24

## 2021-06-24 NOTE — TELEPHONE ENCOUNTER
DENIS Ambrose of [facility] calling to follow up on the clearance for surgery, it was faxed on 6/4/21. They are going to refax it in case it was misplaced.     [phone]

## 2021-07-01 DIAGNOSIS — F91.9 BEHAVIOR DISTURBANCE: ICD-10-CM

## 2021-07-01 DIAGNOSIS — F01.50 VASCULAR DEMENTIA WITHOUT BEHAVIORAL DISTURBANCE (HCC): ICD-10-CM

## 2021-07-01 RX ORDER — QUETIAPINE FUMARATE 50 MG/1
50 TABLET, FILM COATED ORAL
Qty: 45 TABLET | Refills: 0 | Status: SHIPPED | OUTPATIENT
Start: 2021-07-01 | End: 2021-09-03 | Stop reason: SDUPTHER

## 2021-07-01 NOTE — TELEPHONE ENCOUNTER
Please send in medication, for patient.  I advised her that she can discuss adding additional meds at follow up appointment in [facility].

## 2021-07-01 NOTE — TELEPHONE ENCOUNTER
----- Message from Virgen Anaya sent at 7/1/2021 12:41 PM EDT -----  Regarding: /Telephone  Medication Refill    Caller (if not patient):[facility]      Relationship of caller (if not patient):      Best contact number(s): [phone]      Name of medication and dosage if known: seroquel      Is patient out of this medication (yes/no): yes      Pharmacy name: [facility] listed in chart? (yes/no): yes  Pharmacy phone number:      Details to clarify the request: Pt daughter in law called requesting a call back  from nurse and requesting a refill and to discuss if there is another medication to keep asleep at night .       Virgen Anaya

## 2021-08-19 ENCOUNTER — TELEPHONE (OUTPATIENT)
Dept: NEUROLOGY | Age: 66
End: 2021-08-19

## 2021-08-19 NOTE — TELEPHONE ENCOUNTER
----- Message from Olivia Orona sent at 8/19/2021  8:43 AM EDT -----  Regarding: Dr. Thalia Zaldivar first and last name: Art Malin daughter    Reason for call: medical records request    Callback required yes/no and why: Yes, to confirm records sent    Best contact number(s): [phone]    Details to clarify the request: Pt's daughter is requesting that all of pt's records be sent to [facility] neurology clinic [facility] at: [phone]. Pt's daughter is unsure if pt has signed a medical release form.

## 2021-09-03 DIAGNOSIS — F91.9 BEHAVIOR DISTURBANCE: ICD-10-CM

## 2021-09-03 DIAGNOSIS — F01.50 VASCULAR DEMENTIA WITHOUT BEHAVIORAL DISTURBANCE (HCC): ICD-10-CM

## 2021-09-03 RX ORDER — DONEPEZIL HYDROCHLORIDE 5 MG/1
TABLET, FILM COATED ORAL
Qty: 30 TABLET | Refills: 2 | Status: SHIPPED | OUTPATIENT
Start: 2021-09-03

## 2021-09-03 RX ORDER — MEMANTINE HYDROCHLORIDE 10 MG/1
10 TABLET ORAL 2 TIMES DAILY
Qty: 60 TABLET | Refills: 5 | Status: SHIPPED | OUTPATIENT
Start: 2021-09-03

## 2021-09-03 RX ORDER — QUETIAPINE FUMARATE 50 MG/1
50 TABLET, FILM COATED ORAL
Qty: 45 TABLET | Refills: 0 | Status: SHIPPED | OUTPATIENT
Start: 2021-09-03 | End: 2021-12-14 | Stop reason: SDUPTHER

## 2021-09-03 NOTE — TELEPHONE ENCOUNTER
Requested Prescriptions     Pending Prescriptions Disp Refills    donepeziL (ARICEPT) 5 mg tablet 30 Tablet 2    memantine (NAMENDA) 10 mg tablet 60 Tablet 5     Sig: Take 1 Tablet by mouth two (2) times a day.  QUEtiapine (SEROquel) 50 mg tablet 45 Tablet 0     Sig: Take 1 Tablet by mouth nightly. Dr. Marshall Romero refilling for Dr. Darlene Gongora, one time only.

## 2022-01-12 ENCOUNTER — TELEPHONE (OUTPATIENT)
Dept: NEUROLOGY | Age: 67
End: 2022-01-12

## 2022-01-12 DIAGNOSIS — F01.50 VASCULAR DEMENTIA WITHOUT BEHAVIORAL DISTURBANCE (HCC): ICD-10-CM

## 2022-01-12 DIAGNOSIS — R27.8 SENSORY ATAXIA: ICD-10-CM

## 2022-01-12 DIAGNOSIS — I69.993 CVA, OLD, ATAXIA: Primary | ICD-10-CM

## 2022-01-12 NOTE — TELEPHONE ENCOUNTER
Patients daughter in law calling wanting to tell Dr. Georgi Riedel that patient is declining and has went from walking normally to shuffling her feet and needs assistance when walking. Patient did get scheduled on 1/28/2022.  Please advise

## 2022-01-12 NOTE — TELEPHONE ENCOUNTER
Spoke with Kim pass informed her of PT order. She says patient's swallowing is gradually getting worse, is speech therapy appropriate? Also she has difficulty putting on her clothes at times and would patient possibly benefit from OT as well? She would like referrals faxed to [facility] in [address] [place] [address].

## 2022-01-13 DIAGNOSIS — F01.50 VASCULAR DEMENTIA WITHOUT BEHAVIORAL DISTURBANCE (HCC): ICD-10-CM

## 2022-01-13 DIAGNOSIS — R47.81 SLURRED SPEECH: Primary | ICD-10-CM

## 2022-01-18 ENCOUNTER — TELEPHONE (OUTPATIENT)
Dept: NEUROLOGY | Age: 67
End: 2022-01-18

## 2022-01-18 NOTE — TELEPHONE ENCOUNTER
Daughter in [place] ([address]) calling to get details of Physical Therapy for slurred speech. ..states [facility] hasn't received the referral as of yet and wants an updated. Please contact.  Thanks

## 2022-01-24 DIAGNOSIS — I69.993 CVA, OLD, ATAXIA: ICD-10-CM

## 2022-01-24 DIAGNOSIS — F01.50 VASCULAR DEMENTIA WITHOUT BEHAVIORAL DISTURBANCE (HCC): Primary | ICD-10-CM

## 2022-01-24 DIAGNOSIS — E11.42 DIABETIC POLYNEUROPATHY ASSOCIATED WITH TYPE 2 DIABETES MELLITUS (HCC): ICD-10-CM

## 2022-01-25 ENCOUNTER — DOCUMENTATION ONLY (OUTPATIENT)
Dept: NEUROLOGY | Age: 67
End: 2022-01-25

## 2022-02-26 NOTE — TELEPHONE ENCOUNTER
Will have Dr. Tino Sarkar review and will send back once completed. [FreeTextEntry1] : The major issue is the patient's depression and unwillingness to cooperate with physical therapy.  Although he complaints of weakness and not being capable of be independent, he does not want to try to gain strength through a physical therapy program.  One major issue is severe pain and arthritis of the right knee and the nephew states that the patient has an appointment w/ his orthopedic surgeon soon.  I have offered to the patient an antidepressant. He has been on Lexapro in the past, but the patient refuses.\par 1. CKD V. No plan for dialysis as per patient's wishes.  WIll do labs in the near future.\par 2. Edema: continue present dose of Torsemide.\par 3. Lower extremity weakness. The patient at the end of this visit has agreed to physical therapy. Referral given.\par 4. Right knee arthritis. Orthopedic evaluation pending.\par 5. Depression. Patine is no willing to take medications.\par Return in 4 to 6 weeks.

## 2022-03-19 PROBLEM — F33.9 RECURRENT DEPRESSION (HCC): Status: ACTIVE | Noted: 2018-01-12

## 2022-03-23 DIAGNOSIS — E11.42 DIABETIC POLYNEUROPATHY ASSOCIATED WITH TYPE 2 DIABETES MELLITUS (HCC): ICD-10-CM

## 2022-03-23 DIAGNOSIS — F01.50 VASCULAR DEMENTIA WITHOUT BEHAVIORAL DISTURBANCE (HCC): Primary | ICD-10-CM

## 2022-03-23 DIAGNOSIS — I69.993 CVA, OLD, ATAXIA: ICD-10-CM

## 2022-04-20 ASSESSMENT — TONOMETRY
OD_IOP_MMHG: 10
OS_IOP_MMHG: 10

## 2022-04-20 ASSESSMENT — VISUAL ACUITY
OS_SC: 20/30-2
OD_SC: 20/70+2

## 2022-04-26 ENCOUNTER — FOLLOW UP (OUTPATIENT)
Dept: URBAN - METROPOLITAN AREA CLINIC 98 | Facility: CLINIC | Age: 67
End: 2022-04-26

## 2022-04-26 DIAGNOSIS — H35.373: ICD-10-CM

## 2022-04-26 DIAGNOSIS — H47.011: ICD-10-CM

## 2022-04-26 DIAGNOSIS — E11.3393: ICD-10-CM

## 2022-04-26 DIAGNOSIS — H43.813: ICD-10-CM

## 2022-04-26 DIAGNOSIS — Z79.4: ICD-10-CM

## 2022-04-26 PROCEDURE — 99204 OFFICE O/P NEW MOD 45 MIN: CPT

## 2022-04-26 PROCEDURE — 92134 CPTRZ OPH DX IMG PST SGM RTA: CPT

## 2022-04-26 PROCEDURE — 92202 OPSCPY EXTND ON/MAC DRAW: CPT

## 2022-04-26 ASSESSMENT — TONOMETRY
OD_IOP_MMHG: 9
OS_IOP_MMHG: 9

## 2022-04-26 ASSESSMENT — VISUAL ACUITY
OD_SC: 20/150+3
OS_PH: 20/40-2
OS_SC: 20/200-1

## 2022-04-28 NOTE — PATIENT INSTRUCTIONS
PRESCRIPTION REFILL POLICY [facility] Statement to Patients April 1, 2014 In an effort to ensure the large volume of patient prescription refills is processed in the most efficient and expeditious manner, we are asking our patients to assist us by calling your Pharmacy for all prescription refills, this will include also your  Mail Order Pharmacy. The pharmacy will contact our office electronically to continue the refill process. Please do not wait until the last minute to call your pharmacy. We need at least 48 hours (2days) to fill prescriptions. We also encourage you to call your pharmacy before going to  your prescription to make sure it is ready. With regard to controlled substance prescription refill requests (narcotic refills) that need to be picked up at our office, we ask your cooperation by providing us with at least 72 hours (3days) notice that you will need a refill. We will not refill narcotic prescription refill requests after 4:00pm on any weekday, Monday through Thursday, or after 2:00pm on Fridays, or on the weekends. We encourage everyone to explore another way of getting your prescription refill request processed using [employer], our patient web portal through our electronic medical record system. [employer] is an efficient and effective way to communicate your medication request directly to the office and  downloadable as an bethany on your smart phone . [employer] also features a review functionality that allows you to view your medication list as well as leave messages for your physician. Are you ready to get connected? If so please review the attatched instructions or speak to any of our staff to get you set up right away! Thank you so much for your cooperation. Should you have any questions please contact our Practice Administrator. The Physicians and Staff,  [facility] Ventricular tachycardia:  - the patient had a 5 beat run of ventricular tachycardia.  Will check a stat Mg and BMP  - continue telemetry

## 2022-05-16 ENCOUNTER — APPOINTMENT (OUTPATIENT)
Dept: CT IMAGING | Age: 67
End: 2022-05-16
Attending: EMERGENCY MEDICINE
Payer: MEDICARE

## 2022-05-16 ENCOUNTER — HOSPITAL ENCOUNTER (OUTPATIENT)
Age: 67
Setting detail: OBSERVATION
Discharge: NURSING, REHAB OR LONG-TERM CARE | End: 2022-05-19
Attending: EMERGENCY MEDICINE | Admitting: INTERNAL MEDICINE
Payer: MEDICARE

## 2022-05-16 ENCOUNTER — TELEPHONE (OUTPATIENT)
Dept: NEUROLOGY | Age: 67
End: 2022-05-16

## 2022-05-16 DIAGNOSIS — R41.89 EPISODE OF UNRESPONSIVENESS: Primary | ICD-10-CM

## 2022-05-16 DIAGNOSIS — R53.83 LETHARGY: ICD-10-CM

## 2022-05-16 DIAGNOSIS — S09.90XA INJURY OF HEAD, INITIAL ENCOUNTER: ICD-10-CM
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
## 2022-05-16 PROBLEM — R41.82 ALTERED MENTAL STATE: Status: ACTIVE | Noted: 2022-05-16

## 2022-05-16 LAB
ALBUMIN SERPL-MCNC: 3.5 G/DL (ref 3.5–5)
ALBUMIN/GLOB SERPL: 0.9 {RATIO} (ref 1.1–2.2)
ALP SERPL-CCNC: 51 U/L (ref 45–117)
ALT SERPL-CCNC: 19 U/L (ref 12–78)
ANION GAP SERPL CALC-SCNC: 7 MMOL/L (ref 5–15)
APPEARANCE UR: CLEAR
AST SERPL-CCNC: 16 U/L (ref 15–37)
ATRIAL RATE: 70 BPM
BACTERIA URNS QL MICRO: ABNORMAL /HPF
BASOPHILS # BLD: 0.1 K/UL (ref 0–0.1)
BASOPHILS NFR BLD: 1 % (ref 0–1)
BILIRUB SERPL-MCNC: 0.5 MG/DL (ref 0.2–1)
BILIRUB UR QL: NEGATIVE
BUN SERPL-MCNC: 33 MG/DL (ref 6–20)
BUN/CREAT SERPL: 22 (ref 12–20)
CALCIUM SERPL-MCNC: 9.7 MG/DL (ref 8.5–10.1)
CALCULATED P AXIS, ECG09: 50 DEGREES
CALCULATED R AXIS, ECG10: -5 DEGREES
CALCULATED T AXIS, ECG11: 21 DEGREES
CHLORIDE SERPL-SCNC: 106 MMOL/L (ref 97–108)
CO2 SERPL-SCNC: 25 MMOL/L (ref 21–32)
COLOR UR: ABNORMAL
COMMENT, HOLDF: NORMAL
CREAT SERPL-MCNC: 1.49 MG/DL (ref 0.55–1.02)
DIAGNOSIS, 93000: NORMAL
DIFFERENTIAL METHOD BLD: ABNORMAL
EOSINOPHIL # BLD: 0.2 K/UL (ref 0–0.4)
EOSINOPHIL NFR BLD: 2 % (ref 0–7)
EPITH CASTS URNS QL MICRO: ABNORMAL /LPF
ERYTHROCYTE [DISTWIDTH] IN BLOOD BY AUTOMATED COUNT: 14 % (ref 11.5–14.5)
GLOBULIN SER CALC-MCNC: 3.7 G/DL (ref 2–4)
GLUCOSE BLD STRIP.AUTO-MCNC: 118 MG/DL (ref 65–117)
GLUCOSE BLD STRIP.AUTO-MCNC: 127 MG/DL (ref 65–117)
GLUCOSE SERPL-MCNC: 150 MG/DL (ref 65–100)
GLUCOSE UR STRIP.AUTO-MCNC: NEGATIVE MG/DL
HCT VFR BLD AUTO: 32.9 % (ref 35–47)
HGB BLD-MCNC: 10.2 G/DL (ref 11.5–16)
HGB UR QL STRIP: NEGATIVE
HYALINE CASTS URNS QL MICRO: ABNORMAL /LPF (ref 0–5)
IMM GRANULOCYTES # BLD AUTO: 0 K/UL (ref 0–0.04)
IMM GRANULOCYTES NFR BLD AUTO: 0 % (ref 0–0.5)
KETONES UR QL STRIP.AUTO: NEGATIVE MG/DL
LEUKOCYTE ESTERASE UR QL STRIP.AUTO: NEGATIVE
LYMPHOCYTES # BLD: 1.4 K/UL (ref 0.8–3.5)
LYMPHOCYTES NFR BLD: 15 % (ref 12–49)
MAGNESIUM SERPL-MCNC: 1.7 MG/DL (ref 1.6–2.4)
MCH RBC QN AUTO: 28.2 PG (ref 26–34)
MCHC RBC AUTO-ENTMCNC: 31 G/DL (ref 30–36.5)
MCV RBC AUTO: 90.9 FL (ref 80–99)
MONOCYTES # BLD: 0.6 K/UL (ref 0–1)
MONOCYTES NFR BLD: 6 % (ref 5–13)
NEUTS SEG # BLD: 6.9 K/UL (ref 1.8–8)
NEUTS SEG NFR BLD: 76 % (ref 32–75)
NITRITE UR QL STRIP.AUTO: NEGATIVE
NRBC # BLD: 0 K/UL (ref 0–0.01)
NRBC BLD-RTO: 0 PER 100 WBC
P-R INTERVAL, ECG05: 220 MS
PH UR STRIP: 5 [PH] (ref 5–8)
PLATELET # BLD AUTO: 234 K/UL (ref 150–400)
PMV BLD AUTO: 10.6 FL (ref 8.9–12.9)
POTASSIUM SERPL-SCNC: 4.8 MMOL/L (ref 3.5–5.1)
PROT SERPL-MCNC: 7.2 G/DL (ref 6.4–8.2)
PROT UR STRIP-MCNC: NEGATIVE MG/DL
Q-T INTERVAL, ECG07: 420 MS
QRS DURATION, ECG06: 88 MS
QTC CALCULATION (BEZET), ECG08: 453 MS
RBC # BLD AUTO: 3.62 M/UL (ref 3.8–5.2)
RBC #/AREA URNS HPF: ABNORMAL /HPF (ref 0–5)
SAMPLES BEING HELD,HOLD: NORMAL
SERVICE CMNT-IMP: ABNORMAL
SERVICE CMNT-IMP: ABNORMAL
SODIUM SERPL-SCNC: 138 MMOL/L (ref 136–145)
SP GR UR REFRACTOMETRY: 1.01 (ref 1–1.03)
UR CULT HOLD, URHOLD: NORMAL
UROBILINOGEN UR QL STRIP.AUTO: 0.2 EU/DL (ref 0.2–1)
VENTRICULAR RATE, ECG03: 70 BPM
WBC # BLD AUTO: 9.1 K/UL (ref 3.6–11)
WBC URNS QL MICRO: ABNORMAL /HPF (ref 0–4)

## 2022-05-16 PROCEDURE — 65270000029 HC RM PRIVATE

## 2022-05-16 PROCEDURE — 65270000046 HC RM TELEMETRY

## 2022-05-16 PROCEDURE — G0378 HOSPITAL OBSERVATION PER HR: HCPCS

## 2022-05-16 PROCEDURE — 83735 ASSAY OF MAGNESIUM: CPT

## 2022-05-16 PROCEDURE — 82962 GLUCOSE BLOOD TEST: CPT

## 2022-05-16 PROCEDURE — 74011250636 HC RX REV CODE- 250/636: Performed by: INTERNAL MEDICINE

## 2022-05-16 PROCEDURE — 74011250637 HC RX REV CODE- 250/637: Performed by: INTERNAL MEDICINE

## 2022-05-16 PROCEDURE — 93005 ELECTROCARDIOGRAM TRACING: CPT

## 2022-05-16 PROCEDURE — 70450 CT HEAD/BRAIN W/O DYE: CPT

## 2022-05-16 PROCEDURE — 99285 EMERGENCY DEPT VISIT HI MDM: CPT

## 2022-05-16 PROCEDURE — 81001 URINALYSIS AUTO W/SCOPE: CPT

## 2022-05-16 PROCEDURE — 85025 COMPLETE CBC W/AUTO DIFF WBC: CPT

## 2022-05-16 PROCEDURE — 74011250636 HC RX REV CODE- 250/636: Performed by: EMERGENCY MEDICINE

## 2022-05-16 PROCEDURE — 80053 COMPREHEN METABOLIC PANEL: CPT

## 2022-05-16 PROCEDURE — 36415 COLL VENOUS BLD VENIPUNCTURE: CPT

## 2022-05-16 PROCEDURE — 96372 THER/PROPH/DIAG INJ SC/IM: CPT

## 2022-05-16 RX ORDER — INSULIN LISPRO 100 [IU]/ML
INJECTION, SOLUTION INTRAVENOUS; SUBCUTANEOUS
Status: DISCONTINUED | OUTPATIENT
Start: 2022-05-16 | End: 2022-05-19 | Stop reason: HOSPADM

## 2022-05-16 RX ORDER — ONDANSETRON 2 MG/ML
4 INJECTION INTRAMUSCULAR; INTRAVENOUS
Status: DISCONTINUED | OUTPATIENT
Start: 2022-05-16 | End: 2022-05-19 | Stop reason: HOSPADM

## 2022-05-16 RX ORDER — SODIUM CHLORIDE 9 MG/ML
125 INJECTION, SOLUTION INTRAVENOUS CONTINUOUS
Status: DISCONTINUED | OUTPATIENT
Start: 2022-05-16 | End: 2022-05-17

## 2022-05-16 RX ORDER — AMLODIPINE BESYLATE 5 MG/1
5 TABLET ORAL DAILY
Status: DISCONTINUED | OUTPATIENT
Start: 2022-05-16 | End: 2022-05-17

## 2022-05-16 RX ORDER — MAGNESIUM SULFATE 100 %
4 CRYSTALS MISCELLANEOUS AS NEEDED
Status: DISCONTINUED | OUTPATIENT
Start: 2022-05-16 | End: 2022-05-19 | Stop reason: HOSPADM

## 2022-05-16 RX ORDER — AMLODIPINE BESYLATE 5 MG/1
5 TABLET ORAL DAILY
Status: DISCONTINUED | OUTPATIENT
Start: 2022-05-17 | End: 2022-05-16

## 2022-05-16 RX ORDER — MINOXIDIL 2.5 MG/1
2.5 TABLET ORAL 2 TIMES DAILY
Status: DISCONTINUED | OUTPATIENT
Start: 2022-05-16 | End: 2022-05-17

## 2022-05-16 RX ORDER — ACETAMINOPHEN 325 MG/1
650 TABLET ORAL
Status: DISCONTINUED | OUTPATIENT
Start: 2022-05-16 | End: 2022-05-19 | Stop reason: HOSPADM

## 2022-05-16 RX ORDER — ENOXAPARIN SODIUM 100 MG/ML
40 INJECTION SUBCUTANEOUS EVERY 24 HOURS
Status: DISCONTINUED | OUTPATIENT
Start: 2022-05-16 | End: 2022-05-19 | Stop reason: HOSPADM

## 2022-05-16 RX ADMIN — AMLODIPINE BESYLATE 5 MG: 5 TABLET ORAL at 18:40

## 2022-05-16 RX ADMIN — MINOXIDIL 2.5 MG: 2.5 TABLET ORAL at 18:36

## 2022-05-16 RX ADMIN — SODIUM CHLORIDE 1000 ML: 9 INJECTION, SOLUTION INTRAVENOUS at 12:37

## 2022-05-16 RX ADMIN — ENOXAPARIN SODIUM 40 MG: 100 INJECTION SUBCUTANEOUS at 18:35

## 2022-05-16 RX ADMIN — SODIUM CHLORIDE 125 ML/HR: 9 INJECTION, SOLUTION INTRAVENOUS at 18:37

## 2022-05-16 NOTE — Clinical Note
Status[de-identified] INPATIENT [101]   Type of Bed: Medical [8]   Inpatient Hospitalization Certified Necessary for the Following Reasons: 3.  Patient receiving treatment that can only be provided in an inpatient setting (further clarification in H&P documentation)   Admitting Diagnosis: Altered mental state [061681]   Admitting Physician: Jorge Mcknight   Attending Physician: [address] [place] [address],Third Floor, [address] [place] [address]   Estimated Length of Stay: 3-4 Midnights   Discharge Plan[de-identified] Extended Care Facility (e.g. Adult Home, Nursing Home, etc.)

## 2022-05-16 NOTE — TELEPHONE ENCOUNTER
I spoke with patient's daughter-in-law and informed her that if a Neuro consult is needed, Dr. Berto Guzmán is on call.

## 2022-05-16 NOTE — TELEPHONE ENCOUNTER
Patient daughter-in -law called and stated she was at the Adult  this morning and she got very sick. They called the ambulance and they had to do a sternal rub to bring her back. They want her to be transported to [facility].     Please call. I did inform her that Dr. Renee Aceves not in the office this week.

## 2022-05-16 NOTE — ED PROVIDER NOTES
23-year-old female presents from adult  facility with complaint of lethargy. [place] to EMS, they were called because the patient was falling asleep and difficult to arouse. EMS was said they found the patient to be sleeping and required painful stimuli before she woke up. Patient states that she was feeling tired today but denies any other complaints. Denies any chest pain, vomiting, fever, cough, shortness of breath. States she slept well last night and she ate breakfast normally this morning. No other complaints. Past medical history significant for anxiety, stroke, CAD, depression, diabetes, hypertension. Past Medical History:   Diagnosis Date    Anxiety disorder     Cerebral artery occlusion with cerebral infarction ([address]     Coronary artery disease     Depression     Diabetes [address] [address]     Hypertension     Thyroid disease        Past Surgical History:   Procedure Laterality Date    HX HEENT      RI CARDIAC SURG PROCEDURE UNLIST           Family History:   Problem Relation Age of Onset    Heart Disease Mother        Social History     Socioeconomic History    Marital status:      Spouse name: Not on file    Number of children: Not on file    Years of education: Not on file    Highest education level: Not on file   Occupational History    Not on file   Tobacco Use    Smoking status: Never Smoker    Smokeless tobacco: Never Used   Substance and Sexual Activity    Alcohol use: No    Drug use: Not on file    Sexual activity: Not on file   Other Topics Concern    Not on file   Social History Narrative    Not on file     Social Determinants of Health     Financial Resource Strain:     Difficulty of Paying Living Expenses: Not on file   Food Insecurity:     Worried About Running Out of Food in the Last Year: Not on file    Leslie of Food in the Last Year: Not on file   Transportation Needs:     Lack of Transportation (Medical):  Not on file    Lack of Transportation (Non-Medical): Not on file   Physical Activity:     Days of Exercise per Week: Not on file    Minutes of Exercise per Session: Not on file   Stress:     Feeling of Stress : Not on file   Social Connections:     Frequency of Communication with Friends and Family: Not on file    Frequency of Social Gatherings with Friends and Family: Not on file    Attends [employer] Services: Not on file    Active Member of Rising Group or Organizations: Not on file    Attends Club or Organization Meetings: Not on file    Marital Status: Not on file   Intimate Partner Violence:     Fear of Current or Ex-Partner: Not on file    Emotionally Abused: Not on file    Physically Abused: Not on file    Sexually Abused: Not on file   Housing Stability:     Unable to Pay for Housing in the Last Year: Not on file    Number of [place] in the Last Year: Not on file    Unstable Housing in the Last Year: Not on file         ALLERGIES: Patient has no known allergies. Review of Systems   Constitutional: Positive for fatigue. Negative for fever. HENT: Negative for facial swelling. Eyes: Negative for visual disturbance. Respiratory: Negative for chest tightness. Cardiovascular: Negative for chest pain. Gastrointestinal: Negative for abdominal pain. Genitourinary: Negative for difficulty urinating and dysuria. Musculoskeletal: Negative for arthralgias. Skin: Negative for rash. Neurological: Negative for headaches. Hematological: Negative for adenopathy. Psychiatric/Behavioral: Negative for suicidal ideas. Vitals:    05/16/22 1222 05/16/22 1344 05/16/22 1359 05/16/22 1404   BP: (!) 182/77 (!) 171/70 (!) 140/69    Pulse: 74 64 65    Resp: 17 14 15    Temp: 97.8 °F (36.6 °C)      SpO2: 94% 95% (!) 89% 95%   Weight: 88.2 kg (194 lb 7.1 oz)      Height: 5' 11\" (1.803 m)               Physical Exam  Vitals and nursing note reviewed. Constitutional:       General: She is not in acute distress. Appearance: She is well-developed. HENT:      Head: Normocephalic and atraumatic. Eyes:      General: No scleral icterus. Conjunctiva/sclera: Conjunctivae normal.      Pupils: Pupils are equal, round, and reactive to light. Cardiovascular:      Rate and Rhythm: Normal rate. Heart sounds: No murmur heard. Pulmonary:      Effort: Pulmonary effort is normal. No respiratory distress. Abdominal:      General: There is no distension. Musculoskeletal:         General: Normal range of motion. Cervical back: Normal range of motion and neck supple. Skin:     General: Skin is warm and dry. Findings: No rash. Neurological:      Mental Status: She is alert and oriented to person, place, and time. MDM  Number of Diagnoses or Management Options  Episode of unresponsiveness  Fall, initial encounter  Injury of head, initial encounter  Lethargy  Diagnosis management comments: Assessment: Patient here from adult  where she was unresponsive for an unknown period of time. EMS stating that she required a sternal rub and painful stimuli before she regained consciousness. Unknown if this represented a cardiac etiology or a neurologic process. Work-up in the ER was mostly unremarkable. I spoke to the patient's daughter who stated that she has been having creasing difficulty with mobility over the past several weeks and had a fall last night with possible head injury. No signs of any significant injury on head CT. Plans to discuss admission with hospitalist team for further monitoring. Amount and/or Complexity of Data Reviewed  Clinical lab tests: reviewed  Tests in the radiology section of CPT®: reviewed  Tests in the medicine section of CPT®: reviewed      ED Course as of 05/16/22 1503   Mon May 16, 2022   1352 I spoke with daughter-in-law on phone. She reports that patient had fall last night. Unknown if she hit her head.   Pt seemed somewhat out of it but nothing really concerning. Pt seemed normal this morning while getting ready for the  of her. [JM]      ED Course User Index  [JM] Chanel Osuna MD     [facility] Consult for Admission  3:02 PM    ED Room Number: YK65/27  Patient Name and age:  Sarah Benson 73 y.o.  female  Working Diagnosis:   1. Episode of unresponsiveness    2. Lethargy    3. Fall, initial encounter    4. Injury of head, initial encounter        COVID-19 Suspicion:  no  Sepsis present:  no  Reassessment needed: no  Code Status:  Full Code  Readmission: no  Isolation Requirements:  no  Recommended Level of Care:  telemetry  Department:  [facility] Adult ED - [phone]  Admitting Provider:     Other:  Unresponsive period at Adult Day Care. Required sternal rub to regain consciousness. Unclear etiology. Daughter states increased difficulty with mobility over past few weeks. Fall last night with head injury. Total critical care time spent exclusive of procedures:  35 minutes.     Procedures

## 2022-05-16 NOTE — H&P
[facility] Adult  Hospitalist Group  History and Physical    Date of Service:  5/16/2022  Primary Care Provider: Sara Duarte MD  Source of information: The patient and Chart review    Chief Complaint: Lethargy      History of Presenting Illness:   David Higginbotham is a 73 y.o. female who presents with      Pt admitted 2n2 alt mental status    On examining pt she awoke form a deep sleep to give appropriate answers as she could; pt claims poor memory; and not aware of her meds or of her trip to hospital; pt is coming from an adult day care facility on admission, a call to a relative is attempted but no answer. Pt was able to give ROS noting no issues; the exam was unremarkable except for BP being up and her initial marked somnolence that resolved nearly spontaneously and her evident dementia. Pt's chart/meds reviewed; There exist a strong likelihood that pt is over medicated and this contituting a risk for findings; pt evidently fell the evening before admission, the CT of head neg and pt w/o neck pain on admission. Full note to follow but for now limit pt's meds to alternative  bp management meds ; allow diet; obtain PT/OT eval, cover for BS's w SSI; fall precautions and Lovenox for DVT PPX. The patient denies any headache, blurry vision, sore throat, trouble swallowing, trouble with speech, chest pain, SOB, cough, fever, chills, N/V/D, abd pain, urinary symptoms, constipation, recent travels, sick contacts, focal or generalized neurological symptoms, falls, injuries, rashes, contact with COVID-19 diagnosed patients, hematemesis, melena, hemoptysis, hematuria, rashes, denies starting any new medications and denies any other concerns or problems besides as mentioned above. REVIEW OF SYSTEMS:  A comprehensive review of systems was negative except for that written in the History of Present Illness.      Past Medical History:   Diagnosis Date    Anxiety disorder     [facility]bral [facility] occlusion [facility] infarction ([facility]     Coronary artery disease     Depression     Diabetes ([facility]     Hypertension     Thyroid disease       Past Surgical History:   Procedure Laterality Date    HX HEENT      [place] CARDIAC SURG PROCEDURE UNLIST       Prior to Admission medications    Medication Sig Start Date End Date Taking? Authorizing Provider   PARoxetine (PAXIL) 20 mg tablet Take 1 tablet by mouth once daily 4/10/22   Tyrone Demarco MD   QUEtiapine (SEROquel) 50 mg tablet Take 1 Tablet by mouth nightly. 12/15/21   Tyrone Demarco MD   donepeziL (ARICEPT) 5 mg tablet TAKE 1 TABLET BY MOUTH ONCE DAILY AT NIGHT 9/3/21   Tyrone Demarco MD   memantine (NAMENDA) 10 mg tablet Take 1 Tablet by mouth two (2) times a day. 9/3/21   Tyrone Demarco MD   HYDROcodone-acetaminophen (NORCO)  mg tablet Take 1 Tab by mouth every six (6) hours as needed for Pain. Provider, Historical   spironolactone (ALDACTONE) 25 mg tablet Take  by mouth daily. Provider, Historical   magnesium oxide (MAG-OX) 400 mg tablet Take 400 mg by mouth daily. Provider, Historical   cloNIDine HCL (CATAPRES) 0.2 mg tablet Take  by mouth two (2) times a day. Provider, Historical   insulin aspart U-100 (NovoLOG U-100 Insulin aspart) 100 unit/mL injection 14 Units by SubCUTAneous route daily as needed. Provider, Historical   DULoxetine (CYMBALTA) 60 mg capsule Take 1 Cap by mouth daily. 6/2/20   Tyrone Demarco MD   carvedilol (COREG) 25 mg tablet Take 75 mg by mouth two (2) times daily (with meals). Provider, Historical   metFORMIN (GLUCOPHAGE) 500 mg tablet Take 1,000 mg by mouth two (2) times daily (with meals). Provider, Historical   insulin degludec (TRESIBA FLEXTOUCH U-100) 100 unit/mL (3 mL) inpn 27 Units by SubCUTAneous route daily. Provider, Historical   insulin aspart (NOVOLOG) 100 unit/mL injection by SubCUTAneous route.     Provider, Historical   furosemide (LASIX) 20 mg tablet Take 25 mg by mouth daily. Provider, Historical   multivitamin (ONE A DAY) tablet Take 1 Tab by mouth daily. Provider, Historical   aspirin (ASPIRIN) 325 mg tablet Take 325 mg by mouth daily. Provider, Historical   rosuvastatin (CRESTOR) 40 mg tablet Take 75 mg by mouth nightly. Provider, Historical     No Known Allergies   Family History   Problem Relation Age of Onset    Heart Disease Mother       Social History:  reports that she has never smoked. She has never used smokeless tobacco. She reports that she does not drink alcohol. Family and social history were personally reviewed, all pertinent and relevant details are outlined as above. Objective:     Visit Vitals  BP (!) 151/53   Pulse 96   Temp 98.4 °F (36.9 °C)   Resp 20   Ht 5' 11\" (1.803 m)   Wt 88.2 kg (194 lb 7.1 oz)   SpO2 96%   BMI 27.12 kg/m²      O2 Device: None (Room air)    PHYSICAL EXAM:   General: Alert  , awake, no acute distress, resting in bed, pleasant  female, appears to be stated age  HEENT: PEERL, EOMI, moist mucus membranes  Neck: Supple, no JVD, no meningeal signs  Chest: Clear to auscultation bilaterally   CVS: RRR, S1 S2 heard, no murmurs/rubs/gallops  Abd: Soft, non-tender, non-distended, +bowel sounds   Ext: No clubbing, no cyanosis, no edema  Neuro/Psych: Pleasant mood and affect; weak on orientation, knows in hospital else not clear on details of living; recent events ; year ; else  , CN 2-12 grossly intact, sensory grossly within normal limit, Strength 5/5 in all extremities, DTR 1+ x 4  Cap refill: Brisk, less than 3 seconds  Pulses: 2+, symmetric in all extremities  Skin: Warm, dry, without rashes or lesions    Data Review: All diagnostic labs and studies have been reviewed.     Abnormal Labs Reviewed   CBC WITH AUTOMATED DIFF - Abnormal; Notable for the following components:       Result Value    RBC 3.62 (*)     HGB 10.2 (*)     HCT 32.9 (*)     NEUTROPHILS 76 (*)     All other components within normal limits   METABOLIC PANEL, COMPREHENSIVE - Abnormal; Notable for the following components:    Glucose 150 (*)     BUN 33 (*)     Creatinine 1.49 (*)     BUN/Creatinine ratio 22 (*)     GFR est AA 43 (*)     GFR est non-AA 35 (*)     A-G Ratio 0.9 (*)     All other components within normal limits   URINALYSIS W/MICROSCOPIC - Abnormal; Notable for the following components:    Bacteria 1+ (*)     All other components within normal limits   METABOLIC PANEL, BASIC - Abnormal; Notable for the following components:    Glucose 171 (*)     BUN 25 (*)     Creatinine 1.14 (*)     BUN/Creatinine ratio 22 (*)     GFR est AA 58 (*)     GFR est non-AA 48 (*)     All other components within normal limits   CBC WITH AUTOMATED DIFF - Abnormal; Notable for the following components:    WBC 11.7 (*)     HGB 11.4 (*)     ABS.  NEUTROPHILS 8.3 (*)     All other components within normal limits   GLUCOSE, POC - Abnormal; Notable for the following components:    Glucose (POC) 127 (*)     All other components within normal limits   GLUCOSE, POC - Abnormal; Notable for the following components:    Glucose (POC) 118 (*)     All other components within normal limits   GLUCOSE, POC - Abnormal; Notable for the following components:    Glucose (POC) 166 (*)     All other components within normal limits   GLUCOSE, POC - Abnormal; Notable for the following components:    Glucose (POC) 178 (*)     All other components within normal limits   GLUCOSE, POC - Abnormal; Notable for the following components:    Glucose (POC) 166 (*)     All other components within normal limits   GLUCOSE, POC - Abnormal; Notable for the following components:    Glucose (POC) 206 (*)     All other components within normal limits       All Micro Results     Procedure Component Value Units Date/Time    URINE CULTURE HOLD SAMPLE [[id number]] Collected: 05/16/22 1249    Order Status: Completed Specimen: Serum Updated: 05/16/22 1307     Urine culture hold       Urine on hold in Microbiology dept for 2 days. If unpreserved urine is submitted, it cannot be used for addtional testing after 24 hours, recollection will be required. IMAGING:   CT HEAD WO CONT   Final Result   No acute intracranial abnormality. 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 ECG/ECHO:    Results for orders placed or performed during the hospital encounter of 05/16/22   EKG, 12 LEAD, INITIAL   Result Value Ref Range    Ventricular Rate 70 BPM    Atrial Rate 70 BPM    P-R Interval 220 ms    QRS Duration 88 ms    Q-T Interval 420 ms    QTC Calculation (Bezet) 453 ms    Calculated P Axis 50 degrees    Calculated R Axis -5 degrees    Calculated T Axis 21 degrees    Diagnosis       Sinus rhythm with 1st degree AV block  Minimal voltage criteria for LVH, may be normal variant ( R in aVL )  Borderline ECG  No previous ECGs available  Confirmed by Beckie Dia MD ([id number]) on 5/16/2022 1:48:06 PM          Assessment:   Given the patient's current clinical presentation, there is a high level of concern for decompensation if discharged from the emergency department. Complex decision making was performed, which includes reviewing the patient's available past medical records, laboratory results, and imaging studies. Active Problems:    Altered mental state (5/16/2022)      Plan:     Monitor in NS floor/tele  Hold off on meds of potential sedation effects/mild alt effects  PT/OT as can obtain when fully awake  Hydrate  Treat bp         DIET: ADULT DIET Regular; 4 carb choices (60 gm/meal); No Salt Added (3-4 gm)   ISOLATION PRECAUTIONS: There are currently no Active Isolations  CODE STATUS: Full Code   DVT PROPHYLAXIS: Lovenox  FUNCTIONAL STATUS PRIOR TO HOSPITALIZATION: Ambulatory and capable of self-care but relies on assistive devices (rolling walker/cane).    EARLY MOBILITY ASSESSMENT: Recommend routine ambulation while hospitalized with the assistance of nursing staff  ANTICIPATED DISCHARGE: 24-48 hours.  EMERGENCY CONTACT/SURROGATE DECISION MAKER: TBD    CRITICAL CARE WAS PERFORMED FOR THIS ENCOUNTER: NO.      Signed By: Ciro Jose MD     May 18, 2022         Please note that this dictation may have been completed with Dragon, the computer voice recognition software. Quite often unanticipated grammatical, syntax, homophones, and other interpretive errors are inadvertently transcribed by the computer software. Please disregard these errors. Please excuse any errors that have escaped final proofreading.

## 2022-05-16 NOTE — PROGRESS NOTES
Problem: Pressure Injury - Risk of  Goal: *Prevention of pressure injury  Description: Document Jacek Scale and appropriate interventions in the flowsheet. Outcome: Progressing Towards Goal  Note: Pressure Injury Interventions:       Moisture Interventions: Absorbent underpads,Limit adult briefs    Activity Interventions: PT/OT evaluation    Mobility Interventions: Turn and reposition approx. every two hours(pillow and wedges)    Nutrition Interventions: Document food/fluid/supplement intake                     Problem: Falls - Risk of  Goal: *Absence of Falls  Description: Document Lora Shutlz Fall Risk and appropriate interventions in the flowsheet.   Outcome: Progressing Towards Goal  Note: Fall Risk Interventions:  Mobility Interventions: Communicate number of staff needed for ambulation/transfer    Mentation Interventions: Reorient patient         Elimination Interventions: Call light in reach              Problem: Patient Education: Go to Patient Education Activity  Goal: Patient/Family Education  Outcome: Progressing Towards Goal

## 2022-05-16 NOTE — ED TRIAGE NOTES
Patient arrives via EMS from [address] [place] [address] with c/o lethargy. Patient sleeping on arrival. Arousal to verbal stimulation. Vital stable in route. Patient A&O x 3.      Hx mild dementia, hypothyroidism, DM2, heart disease, kidney disease

## 2022-05-17 LAB
GLUCOSE BLD STRIP.AUTO-MCNC: 166 MG/DL (ref 65–117)
GLUCOSE BLD STRIP.AUTO-MCNC: 166 MG/DL (ref 65–117)
GLUCOSE BLD STRIP.AUTO-MCNC: 178 MG/DL (ref 65–117)
GLUCOSE BLD STRIP.AUTO-MCNC: 206 MG/DL (ref 65–117)
SERVICE CMNT-IMP: ABNORMAL

## 2022-05-17 PROCEDURE — 65270000029 HC RM PRIVATE

## 2022-05-17 PROCEDURE — 97163 PT EVAL HIGH COMPLEX 45 MIN: CPT

## 2022-05-17 PROCEDURE — 97530 THERAPEUTIC ACTIVITIES: CPT

## 2022-05-17 PROCEDURE — 74011250636 HC RX REV CODE- 250/636: Performed by: NURSE PRACTITIONER

## 2022-05-17 PROCEDURE — 77030038269 HC DRN EXT URIN PURWCK BARD -A

## 2022-05-17 PROCEDURE — 96372 THER/PROPH/DIAG INJ SC/IM: CPT

## 2022-05-17 PROCEDURE — 74011636637 HC RX REV CODE- 636/637: Performed by: INTERNAL MEDICINE

## 2022-05-17 PROCEDURE — 74011250636 HC RX REV CODE- 250/636: Performed by: INTERNAL MEDICINE

## 2022-05-17 PROCEDURE — 74011250637 HC RX REV CODE- 250/637: Performed by: INTERNAL MEDICINE

## 2022-05-17 PROCEDURE — 82962 GLUCOSE BLOOD TEST: CPT

## 2022-05-17 PROCEDURE — 96374 THER/PROPH/DIAG INJ IV PUSH: CPT

## 2022-05-17 PROCEDURE — 65270000046 HC RM TELEMETRY

## 2022-05-17 PROCEDURE — G0378 HOSPITAL OBSERVATION PER HR: HCPCS

## 2022-05-17 PROCEDURE — 97165 OT EVAL LOW COMPLEX 30 MIN: CPT

## 2022-05-17 RX ORDER — AMLODIPINE BESYLATE 5 MG/1
10 TABLET ORAL DAILY
Status: DISCONTINUED | OUTPATIENT
Start: 2022-05-18 | End: 2022-05-19 | Stop reason: HOSPADM

## 2022-05-17 RX ORDER — METOPROLOL TARTRATE 50 MG/1
50 TABLET ORAL 2 TIMES DAILY
Status: DISCONTINUED | OUTPATIENT
Start: 2022-05-17 | End: 2022-05-19 | Stop reason: HOSPADM

## 2022-05-17 RX ORDER — HYDRALAZINE HYDROCHLORIDE 20 MG/ML
10 INJECTION INTRAMUSCULAR; INTRAVENOUS
Status: DISCONTINUED | OUTPATIENT
Start: 2022-05-17 | End: 2022-05-19 | Stop reason: HOSPADM

## 2022-05-17 RX ORDER — MINOXIDIL 2.5 MG/1
10 TABLET ORAL 2 TIMES DAILY
Status: DISCONTINUED | OUTPATIENT
Start: 2022-05-17 | End: 2022-05-17

## 2022-05-17 RX ORDER — MINOXIDIL 2.5 MG/1
10 TABLET ORAL 2 TIMES DAILY
Status: DISCONTINUED | OUTPATIENT
Start: 2022-05-17 | End: 2022-05-19 | Stop reason: HOSPADM

## 2022-05-17 RX ORDER — AMLODIPINE BESYLATE 5 MG/1
5 TABLET ORAL ONCE
Status: COMPLETED | OUTPATIENT
Start: 2022-05-17 | End: 2022-05-17

## 2022-05-17 RX ADMIN — HYDRALAZINE HYDROCHLORIDE 10 MG: 20 INJECTION, SOLUTION INTRAMUSCULAR; INTRAVENOUS at 03:12

## 2022-05-17 RX ADMIN — MINOXIDIL 2.5 MG: 2.5 TABLET ORAL at 08:44

## 2022-05-17 RX ADMIN — Medication 2 UNITS: at 17:38

## 2022-05-17 RX ADMIN — MINOXIDIL 10 MG: 2.5 TABLET ORAL at 10:09

## 2022-05-17 RX ADMIN — ENOXAPARIN SODIUM 40 MG: 100 INJECTION SUBCUTANEOUS at 17:38

## 2022-05-17 RX ADMIN — AMLODIPINE BESYLATE 5 MG: 5 TABLET ORAL at 10:09

## 2022-05-17 RX ADMIN — Medication 2 UNITS: at 12:30

## 2022-05-17 RX ADMIN — Medication 2 UNITS: at 08:44

## 2022-05-17 RX ADMIN — METOPROLOL TARTRATE 50 MG: 50 TABLET ORAL at 17:37

## 2022-05-17 RX ADMIN — AMLODIPINE BESYLATE 5 MG: 5 TABLET ORAL at 08:44

## 2022-05-17 RX ADMIN — SODIUM CHLORIDE 125 ML/HR: 9 INJECTION, SOLUTION INTRAVENOUS at 03:12

## 2022-05-17 RX ADMIN — SODIUM CHLORIDE 125 ML/HR: 9 INJECTION, SOLUTION INTRAVENOUS at 11:48

## 2022-05-17 NOTE — PROGRESS NOTES
Problem: Self Care Deficits Care Plan (Adult)  Goal: *Acute Goals and Plan of Care (Insert Text)  Description: FUNCTIONAL STATUS PRIOR TO ADMISSION: Patient was independent and active without use of DME. Patient Min-Mod A for basic ADLs (mostly bathing), assist for IADLs. Pateint with baseline cognitive impairment, attends an adult day care program 5 days/week     HOME SUPPORT: The patient lived with her family who assists PRN. Occupational Therapy Goals  Initiated 5/17/2022  1. Patient will perform at least one standing grooming task with minimal assistance/contact guard assist within 7 day(s). 2.  Patient will perform bathing with minimal assistance/contact guard assist within 7 day(s). 3.  Patient will perform upper and lower body dressing with minimal assistance/contact guard assist within 7 day(s). 4.  Patient will perform toilet transfers with minimal assistance/contact guard assist within 7 day(s). 5.  Patient will perform all aspects of toileting with minimal assistance/contact guard assist within 7 day(s). 6.  Patient will participate in upper extremity therapeutic exercise/activities with no more than minimal assistance/contact guard assist for 10 minutes within 7 day(s). 7.  Patient will utilize energy conservation techniques during functional activities with verbal and visual cues within 7 day(s). Outcome: Not Met     OCCUPATIONAL THERAPY EVALUATION  Patient: Ac Plummer (49 y.o. female)  Date: 5/17/2022  Primary Diagnosis: Altered mental state [R41.82]        Precautions: Fall    ASSESSMENT  Based on the objective data described below, the patient presents with decreased balance, activity tolerance, strength, coordination, vision, cognition, and cardiopulmonary endurance s/p admission for AMS, lethargy, and recent GLF, CT negative for acute neuro process.  At baseline, she is IND-SPV for mobility with no AD, requires some assist for ADLs & IADLs, and attends an adult day care program during the weekdays, lives with family. She now presents below this baseline, requiring Min-Mod A for OOB ADLs, Min-Mod A x2 for limited OOB mobility to the chair d/t poor standing balance, R>L LE ataxia, and poor safety awareness. With limited activity, patient with HTN (-197), HR up to 140 with transfer to the chair, slowly  returning to 120s with seated rest break. With visual assessment, patient with increased difficulty scanning & targeting to the L, also inconsistent with functional tasks and attending to tasks on the R. Given her functional deficits and PLOF, she would benefit from d/c to IPR, would require up to 2 assist for safety & [facility] services if she was to return home. Current Level of Function Impacting Discharge (ADLs/self-care): Min-Mod A for ADLs, Min-Mod A x2 for OOB mobility (limited)    Functional Outcome Measure: The patient scored Total: 30/100 on the Barthel Index outcome measure which is indicative of being very dependent in basic self-care. Other factors to consider for discharge: fall risk, assist of up to 2, PMH, PLOF     Patient will benefit from skilled therapy intervention to address the above noted impairments. PLAN :  Recommendations and Planned Interventions: self care training, functional mobility training, therapeutic exercise, balance training, visual/perceptual training, therapeutic activities, cognitive retraining, endurance activities, neuromuscular re-education, patient education, home safety training, and family training/education    Frequency/Duration: Patient will be followed by occupational therapy 5 times a week to address goals.     Recommendation for discharge: (in order for the patient to meet his/her long term goals)  Therapy 3 hours per day 5-7 days per week  If d/c home, would require assist for all ADLs/IADLs and mobility of up to 2 with HHOT    This discharge recommendation:  Has been made in collaboration with the attending provider and/or case management    IF patient discharges home will need the following DME: To be determined (TBD)         SUBJECTIVE:   Patient stated Oh they play ball with us. And bingo! re: when asked about adult day care activities    OBJECTIVE DATA SUMMARY:   HISTORY:   Past Medical History:   Diagnosis Date    Anxiety disorder     Cerebral artery occlusion with cerebral infarction ([facility] [address]     Coronary artery disease     Depression     Diabetes ([facility] [address]     Hypertension     Thyroid disease      Past Surgical History:   Procedure Laterality Date    HX HEENT      [place] CARDIAC SURG PROCEDURE UNLIST         Expanded or extensive additional review of patient history:     Home Situation  Home Environment: Trailer/mobile home  # Steps to Enter: 3  Rails to Enter: Yes  Hand Rails : Bilateral  One/Two Story Residence: One story  Living Alone: No  Support Systems: Child(antionette) (son, daughter in law, grandson)  Patient Expects to be Discharged to[de-identified] Home  Current DME Used/Available at Home: None  Tub or Shower Type: Tub/Shower combination    Hand dominance: Right    EXAMINATION OF PERFORMANCE DEFICITS:  Cognitive/Behavioral Status:  Neurologic State: Alert  Orientation Level: Oriented to person;Oriented to place; Disoriented to time;Disoriented to situation  Cognition: Decreased attention/concentration; Follows commands; Impaired decision making;Poor safety awareness  Perception: Cues to attend left visual field;Cues to attend right visual field (flucutating, incr diff w/ L with assessment)  Perseveration: No perseveration noted  Safety/Judgement: Decreased awareness of environment;Decreased awareness of need for assistance;Decreased awareness of need for safety;Decreased insight into deficits    Skin: Appears intact    Edema: None    Hearing: Auditory  Auditory Impairment: None    Vision/Perceptual:    Tracking: Able to track stimulus in all quadrants w/o difficulty; Requires cues, head turns, or add eye shifts to track (increased difficulty with L tracking in all VFs)              Visual Fields: Difficulty detecting stimulus  in left lateral quadrant; Difficulty detecting stimulus in left lower quadrant; Difficulty detecting stimulus in left upper quadrant (inconsistent to R with functional activity)  Diplopia: No              Range of Motion:  AROM: Generally decreased, functional  PROM: Within functional limits                      Strength:  Strength: Generally decreased, functional (L  slightly decreased, LLE decreased)                Coordination:  Coordination: Generally decreased, functional (LUE & LLE)  Fine Motor Skills-Upper: Left Impaired;Right Intact    Gross Motor Skills-Upper: Left Intact; Right Intact    Tone & Sensation:  Tone: Abnormal  Sensation: Intact                      Balance:  Sitting: Intact  Standing: Impaired; With support (angeles HHA)  Standing - Static: Fair;Constant support  Standing - Dynamic : Poor;Constant support    Functional Mobility and Transfers for ADLs:  Bed Mobility:  Rolling: Stand-by assistance  Supine to Sit: Contact guard assistance  Sit to Supine:  (ended session in chair)  Scooting: Minimum assistance; Moderate assistance;Assist x1 (EOB, decr coordination)    Transfers:  Sit to Stand: Minimum assistance;Assist x2  Stand to Sit: Minimum assistance;Assist x2  Bed to Chair: Moderate assistance;Assist x2  Toilet Transfer : Moderate assistance;Assist x2;Adaptive equipment (infer to [facility] per mobility)  Assistive Device : Gait Belt    ADL Assessment:  Feeding: Minimum assistance    Oral Facial Hygiene/Grooming: Minimum assistance    Bathing: Moderate assistance    Upper Body Dressing: Minimum assistance    Lower Body Dressing: Moderate assistance    Toileting: Moderate assistance                ADL Intervention and task modifications:     Lower Body Dressing Assistance  Dressing Assistance:  Moderate assistance  Pants With Elastic Waist: Moderate assistance (simulated, A for stand balance)  Socks: Stand-by assistance  Leg Crossed Method Used: Yes  Position Performed: Seated in chair;Standing  Cues: Physical assistance; Tactile cues provided;Verbal cues provided;Visual cues provided      Cognitive Retraining  Safety/Judgement: Decreased awareness of environment;Decreased awareness of need for assistance;Decreased awareness of need for safety;Decreased insight into deficits    Functional Measure:    Barthel Index:  Bathin  Bladder: 5  Bowels: 5  Groomin  Dressin  Feedin  Mobility: 0  Stairs: 0  Toilet Use: 5  Transfer (Bed to Chair and Back): 5  Total: 30/100      The Barthel ADL Index: Guidelines  1. The index should be used as a record of what a patient does, not as a record of what a patient could do. 2. The main aim is to establish degree of independence from any help, physical or verbal, however minor and for whatever reason. 3. The need for supervision renders the patient not independent. 4. A patient's performance should be established using the best available evidence. Asking the patient, friends/relatives and nurses are the usual sources, but direct observation and common sense are also important. However direct testing is not needed. 5. Usually the patient's performance over the preceding 24-48 hours is important, but occasionally longer periods will be relevant. 6. Middle categories imply that the patient supplies over 50 per cent of the effort. 7. Use of aids to be independent is allowed. Score Interpretation (from [address] [place] [address])    Independent   60-79 Minimally independent   40-59 Partially dependent   20-39 Very dependent   <20 Totally dependent     -Cassie Marroquin., Barthel, D.W. (1965). Functional evaluation: the Barthel Index. [address] [facility] ([address] (1997). The Barthel activities of daily living index: self-reporting versus actual performance in the old (> or = 75 years).  Journal of [address] [place] [address] 45(7), [facility], J.VIJAYA, Nathen Sylvester., Sandra De Guzman. (1999). Measuring the change in disability after inpatient rehabilitation; comparison of the responsiveness of the Barthel Index and Functional [place] Measure. Journal of Neurology, Neurosurgery, and Psychiatry, 66(4), 216-825. MARIANA Gonzales, KETTY Hill, & Woodrow Estevez M.A. (2004) Assessment of post-stroke quality of life in cost-effectiveness studies: The usefulness of the Barthel Index and the EuroQoL-5D. Quality of Life Research, 15, 582-91     Occupational Therapy Evaluation Charge Determination   History Examination Decision-Making   LOW Complexity : Brief history review  LOW Complexity : 1-3 performance deficits relating to physical, cognitive , or psychosocial skils that result in activity limitations and / or participation restrictions  LOW Complexity : No comorbidities that affect functional and no verbal or physical assistance needed to complete eval tasks       Based on the above components, the patient evaluation is determined to be of the following complexity level: LOW   Pain Rating:  None    Activity Tolerance:   Fair    After treatment patient left in no apparent distress:    Sitting in chair, Call bell within reach, and Bed / chair alarm activated    COMMUNICATION/EDUCATION:   The patients plan of care was discussed with: Physical therapist and Registered nurse. Home safety education was provided and the patient/caregiver indicated understanding., Patient/family have participated as able in goal setting and plan of care. , and Patient/family agree to work toward stated goals and plan of care. This patients plan of care is appropriate for delegation to [facility].     Thank you for this referral.  Waqas Fraga OTD, OTR/L  Time Calculation: 19 mins

## 2022-05-17 NOTE — PROGRESS NOTES
Occupational Therapy  05/17/22    Orders received, chart reviewed and patient evaluated by occupational therapy. Pending progression with skilled acute occupational therapy, recommend:  Therapy 3 hours per day 5-7 days per week If d/c home, would require 2 assist for OOB ADLs and mobility    Recommend with nursing ADLs with assist, OOB to chair 3x/day and toileting via bedside commode with 2 assist. Thank you for completing as able in order to maintain patient strength, endurance and independence. Full evaluation to follow.     Thank you,   Danyell Cheatham, BENNIE, OTR/L

## 2022-05-17 NOTE — ROUTINE PROCESS
Bedside and Verbal shift change report given to Misty Pereyra RN (oncoming nurse) by Aaron Fischer RN (offgoing nurse). Report included the following information SBAR, Kardex, MAR, Cardiac Rhythm ST and Dual Neuro Assessment.

## 2022-05-17 NOTE — PROGRESS NOTES
Raymon Loron called because pt had an episode of being joanne in the 50s then jumping up over 100. Went in patient's room and she was vomiting. After episode of vomiting, said she'd felt sick and now much better. By then heart rate in the 90s and NSR. Sent message to NP [facility] to inform her.

## 2022-05-17 NOTE — PROGRESS NOTES
Problem: Mobility Impaired (Adult and Pediatric)  Goal: *Acute Goals and Plan of Care (Insert Text)  Description: FUNCTIONAL STATUS PRIOR TO ADMISSION: Pt poor historian, reports she was independent for all mobility and family assisted with some ADLs, but had conflicting responses to PLOF questions. HOME SUPPORT PRIOR TO ADMISSION: Pt lives with son, daughter in law, and grandson in a trailer. She attends adult day care 5x/week. Unclear how much assistance pt was requiring. Physical Therapy Goals  Initiated 5/17/2022  1. Patient will move from supine to sit and sit to supine , scoot up and down, and roll side to side in bed with independence within 7 day(s). 2.  Patient will transfer from bed to chair and chair to bed with CGA using the least restrictive device within 7 day(s). 3.  Patient will perform sit to stand with CGA within 7 day(s). 4.  Patient will ambulate with Yasemin for 50 feet with the least restrictive device within 7 day(s). 5.  Patient will ascend/descend 3 stairs with bilateral handrail(s) with Yasemin within 7 day(s). Outcome: Not Met    PHYSICAL THERAPY EVALUATION  Patient: Vinay Saul (67 y.o. female)  Date: 5/17/2022  Primary Diagnosis: Altered mental state [R41.82]        Precautions: fall       ASSESSMENT  Based on the objective data described below, the patient presents with impaired cognition (baseline), impaired balance, decreased activity tolerance, decreased cardiopulmonary endurance, impaired coordination (most pronounced LUE), impaired vision (see OT note) and L sided weakness (LLE> ULE) s/p admission for AMS. CT imaging negative for acute process but positive for history of L occipital infarct and previous CT show history of L parietal infarct. At baseline, pt reports she is independent with mobility and family assists with some ADLs, however responded inconsistently to PLOF questions.  Pt lives with son, daughter in law, and grandson in a trailer and attends adult day care 5x/week. This date, pt with L hip flexion and L knee extension weakness and decreased coordination upon formal testing. Pt required Yasemin x2 for sit<>stand transfer. Pt modA x2 for a few steps bed>chair with B HHA and poor step mechanics. Pt with elevated BP throughout session (SBP up to 190s) and tachycardic (140s) with minimal activity (transferring bed>chair) therefore further mobility was deferred. Sitting /64; sitting in recliner after transfer 197/72 (RN aware). Ended session in chair with all needs met. Recommending IPR upon discharge. If home, HHPT with assist of 2 for OOB mobility. Current Level of Function Impacting Discharge (mobility/balance): modA x2 bed<>chair transfer    Functional Outcome Measure: The patient scored 4/56 on the Jara outcome measure which is indicative of high fall risk. Other factors to consider for discharge: unclear baseline, impaired cognition, fall risk, tachycardia, hypertension      Patient will benefit from skilled therapy intervention to address the above noted impairments. PLAN :  Recommendations and Planned Interventions: bed mobility training, transfer training, gait training, therapeutic exercises, neuromuscular re-education, patient and family training/education, and therapeutic activities      Frequency/Duration: Patient will be followed by physical therapy:  5 times a week to address goals. Recommendation for discharge: (in order for the patient to meet his/her long term goals)  Therapy 3 hours per day 5-7 days per week  If home, HHPT with assist of 2 for OOB mobility. This discharge recommendation:  Has not yet been discussed the attending provider and/or case management    IF patient discharges home will need the following DME: to be determined (TBD)         SUBJECTIVE:   Patient stated Lorre Perish I get myself dressed.     OBJECTIVE DATA SUMMARY:   HISTORY:    Past Medical History:   Diagnosis Date    Anxiety disorder     Cerebral artery occlusion with cerebral infarction [facility])     Coronary artery disease     Depression     Diabetes ([facility] [address]     Hypertension     Thyroid disease      Past Surgical History:   Procedure Laterality Date    HX HEENT      [place] CARDIAC SURG PROCEDURE UNLIST         Home Situation  Home Environment: Trailer/mobile home  # Steps to Enter: 3  Rails to Enter: Yes  Hand Rails : Bilateral  One/Two Story Residence: One story  Living Alone: No  Support Systems: Child(antionette) (son, daughter in law, grandson)  Current DME Used/Available at Home: None  Tub or Shower Type: Tub/Shower combination    EXAMINATION/PRESENTATION/DECISION MAKING:   Critical Behavior:  Neurologic State: Alert  Orientation Level: Oriented to person,Oriented to place,Disoriented to time,Disoriented to situation  Cognition: Follows commands     Hearing:   Auditory  Auditory Impairment: None    Range Of Motion:  AROM: Within functional limits   PROM: Within functional limits     Strength:    Strength: Generally decreased, functional     Tone & Sensation:   Tone: Abnormal  Sensation: Intact     Coordination:  Coordination: Generally decreased, functional (LE>UE)    Functional Mobility:  Bed Mobility:  Rolling: Stand-by assistance  Supine to Sit: Contact guard assistance  Sit to Supine:  (ended session in chair)  Scooting: Minimum assistance  Transfers:  Sit to Stand: Minimum assistance;Assist x2  Stand to Sit: Minimum assistance;Assist x2  Bed to Chair: Moderate assistance;Assist x2    Balance:   Sitting: Intact  Standing: Impaired  Standing - Static: Good;Constant support  Standing - Dynamic : Poor;Constant support      Functional Measure:  Jara Balance Test:    Sitting to Standin  Standing Unsupported: 0  Sitting with Back Unsupported: 3  Standing to Sittin  Transfers: 0  Standing Unsupported with Eyes Closed: 0  Standing Unsupported with Feet Together: 0  Reach Forward with Outstretched Arm: 0   Object: 0  Turn to Look Over Shoulders: 0  Turn 360 Degrees: 0  Alternate Foot on Step/Stool: 0  Standing Unsupported One Foot in Front: 0  Stand on One Le  Total: /         56=Maximum possible score;   0-20=High fall risk  21-40=Moderate fall risk   41-56=Low fall risk          Physical Therapy Evaluation Charge Determination   History Examination Presentation Decision-Making   HIGH Complexity :3+ comorbidities / personal factors will impact the outcome/ POC  HIGH Complexity : 4+ Standardized tests and measures addressing body structure, function, activity limitation and / or participation in recreation  HIGH Complexity : Unstable and unpredictable characteristics  Other outcome measures Jara  HIGH       Based on the above components, the patient evaluation is determined to be of the following complexity level: HIGH     Activity Tolerance:   Poor    After treatment patient left in no apparent distress:   Sitting in chair, Call bell within reach, and Bed / chair alarm activated    COMMUNICATION/EDUCATION:   The patients plan of care was discussed with: Occupational therapist and Registered nurse. Fall prevention education was provided and the patient/caregiver indicated understanding., Patient/family have participated as able in goal setting and plan of care. , and Patient/family agree to work toward stated goals and plan of care. Regarding student involvement in patient care:  A student participated in this treatment session. Per CMS Medicare statements and APTA guidelines I certify that the following was true:  1. I was present and directly observed the entire session. 2. I made all skilled judgments and clinical decisions regarding care. 3. I am the practitioner responsible for assessment, treatment, and documentation.     Thank you for this referral.  Holly Machuca, SPT   Time Calculation: 28 mins

## 2022-05-17 NOTE — PROGRESS NOTES
Attempts to contact family fails x 2 (  and  Again  12:59 PM )    Pt cont to need to be in hospital;  BP management issues loom; Pt is else seemingly back to baseline; pt's  polypharmacy is largely removed/resoved;     Goals   1. Simple, but effective,  bp management program of meds     2. Safe discharge   3. Simplified shorter medicine program without sedating/mind altering meds as  Can accomplish      Pt appears to be a baseline ; By end of day :      PT/OT notes appreciated  Dispo planning forward     A full note to follow     Omar Longoria MD       [facility]  Hospitalist Group                                                                                          Hospitalist Progress Note  Omar Longoria MD  Answering service: [phone] from in house phone        Date of Service:  2022  NAME:  Radha Garcia  :  1955  MRN:  [id number]      Admission Summary:   Copied form admit: Alondra Mandujano is a 73 y.o. female who presents with        Pt admitted 2n2 alt mental status     On examining pt she awoke form a deep sleep to give appropriate answers as she could; pt claims poor memory; and not aware of her meds or of her trip to hospital; pt is coming from an adult day care facility on admission, a call to a relative is attempted but no answer.      Pt was able to give ROS noting no issues; the exam was unremarkable except for BP being up and her initial marked somnolence that resolved nearly spontaneously and her evident dementia.      Pt's chart/meds reviewed;   There exist a strong likelihood that pt is over medicated and this contituting a risk for findings; pt evidently fell the evening before admission, the CT of head neg and pt w/o neck pain on admission.      Full note to follow but for now limit pt's meds to alternative  bp management meds ; allow diet; obtain PT/OT eval, cover for BS's w SSI; fall precautions and Lovenox for DVT PPX.           The patient denies any headache, blurry vision, sore throat, trouble swallowing, trouble with speech, chest pain, SOB, cough, fever, chills, N/V/D, abd pain, urinary symptoms, constipation, recent travels, sick contacts, focal or generalized neurological symptoms, falls, injuries, rashes, contact with COVID-19 diagnosed patients, hematemesis, melena, hemoptysis, hematuria, rashes, denies starting any new medications and denies any other concerns or problems besides as mentioned above. \"    Interval history / Subjective:     5/17/2022 :    Off meds of sedation/alt mental issues, pt returns to near  baseline   For PT /OT eval    [place] as below        Assessment & Plan:     Dementia, chronic ;   Poly pharmacy; addressed w simplified med regimen  HTN; meds adjusted avoiding clonidine but allowing BB   Alt mental status, 2n2 poly pharmacy likley ; addressed by med reduction to essential     Code status: full   DVT prophylaxis: Lovenox        dipo TBD      Hospital Problems  Never Reviewed          Codes Class Noted POA    Altered mental state ICD-10-CM: R41.82  ICD-9-CM: 780.97  5/16/2022 Unknown                  After personally interviewing pt at bedside the following is noted on 5/18/2022 :    Review of Systems   Reason unable to perform ROS: dementia. I had a face to face encounter with patient on 5/18/2022 at bedside for the following physical exam:     PHYSICAL EXAM:    Visit Vitals  /72 (BP 1 Location: Right upper arm, BP Patient Position: Sitting)   Pulse 91   Temp 98.1 °F (36.7 °C)   Resp 20   Ht 5' 11\" (1.803 m)   Wt 88.2 kg (194 lb 7.1 oz)   SpO2 100%   BMI 27.12 kg/m²          Physical Exam  Constitutional:       General: She is not in acute distress. Appearance: She is obese. She is not ill-appearing, toxic-appearing or diaphoretic.    HENT:      Right Ear: External ear normal.      Left Ear: External ear normal.      Nose: Nose normal.      Mouth/Throat:      Mouth: Mucous membranes are dry. Pharynx: Oropharynx is clear. Eyes:      Extraocular Movements: Extraocular movements intact. Conjunctiva/sclera: Conjunctivae normal.      Pupils: Pupils are equal, round, and reactive to light. Cardiovascular:      Rate and Rhythm: Normal rate and regular rhythm. Pulmonary:      Effort: Pulmonary effort is normal.      Breath sounds: Normal breath sounds. Abdominal:      General: Abdomen is flat. Bowel sounds are normal.      Palpations: Abdomen is soft. Musculoskeletal:         General: No swelling or deformity. Cervical back: Normal range of motion and neck supple. Skin:     Coloration: Skin is not jaundiced or pale. Neurological:      General: No focal deficit present. Mental Status: Mental status is at baseline. Comments: Dementia    Psychiatric:         Mood and Affect: Mood normal.         Behavior: Behavior normal.                     Data Review:    Review and/or order of clinical lab test      Labs:     Recent Labs     05/18/22  0101 05/16/22  1233   WBC 11.7* 9.1   HGB 11.4* 10.2*   HCT 35.5 32.9*    234     Recent Labs     05/18/22  0101 05/16/22  1233    138   K 3.5 4.8    106   CO2 26 25   BUN 25* 33*   CREA 1.14* 1.49*   * 150*   CA 9.3 9.7   MG  --  1.7     Recent Labs     05/16/22  1233   ALT 19   AP 51   TBILI 0.5   TP 7.2   ALB 3.5   GLOB 3.7     No results for input(s): INR, PTP, APTT, INREXT in the last 72 hours. No results for input(s): FE, TIBC, PSAT, FERR in the last 72 hours. No results found for: FOL, RBCF   No results for input(s): PH, PCO2, PO2 in the last 72 hours. No results for input(s): CPK, CKNDX, TROIQ in the last 72 hours.     No lab exists for component: CPKMB  No results found for: CHOL, CHOLX, CHLST, CHOLV, HDL, HDLP, LDL, LDLC, DLDLP, TGLX, TRIGL, TRIGP, CHHD, CHHDX  Lab Results   Component Value Date/Time    Glucose (POC) 187 (H) 05/18/2022 04:46 PM    Glucose (POC) 178 (H) 05/18/2022 11:58 AM Glucose (POC) 160 (H) 05/18/2022 07:46 AM    Glucose (POC) 206 (H) 05/17/2022 08:44 PM    Glucose (POC) 166 (H) 05/17/2022 04:58 PM     Lab Results   Component Value Date/Time    Color YELLOW/STRAW 05/16/2022 12:49 PM    Appearance CLEAR 05/16/2022 12:49 PM    Specific gravity 1.009 05/16/2022 12:49 PM    pH (UA) 5.0 05/16/2022 12:49 PM    Protein Negative 05/16/2022 12:49 PM    Glucose Negative 05/16/2022 12:49 PM    Ketone Negative 05/16/2022 12:49 PM    Bilirubin Negative 05/16/2022 12:49 PM    Urobilinogen 0.2 05/16/2022 12:49 PM    Nitrites Negative 05/16/2022 12:49 PM    Leukocyte Esterase Negative 05/16/2022 12:49 PM    Epithelial cells FEW 05/16/2022 12:49 PM    Bacteria 1+ (A) 05/16/2022 12:49 PM    WBC 0-4 05/16/2022 12:49 PM    RBC 0-5 05/16/2022 12:49 PM         Medications Reviewed:     Current Facility-Administered Medications   Medication Dose Route Frequency    hydrALAZINE (APRESOLINE) 20 mg/mL injection 10 mg  10 mg IntraVENous Q6H PRN    minoxidiL (LONITEN) tablet 10 mg  10 mg Oral BID    amLODIPine (NORVASC) tablet 10 mg  10 mg Oral DAILY    metoprolol tartrate (LOPRESSOR) tablet 50 mg  50 mg Oral BID    ziprasidone (GEODON) 10 mg in sterile water (preservative free) 0.5 mL injection  10 mg IntraMUSCular Q12H PRN    insulin lispro (HUMALOG) injection   SubCUTAneous TIDAC    glucose chewable tablet 16 g  4 Tablet Oral PRN    glucagon (GLUCAGEN) injection 1 mg  1 mg IntraMUSCular PRN    dextrose 10 % infusion 0-250 mL  0-250 mL IntraVENous PRN    enoxaparin (LOVENOX) injection 40 mg  40 mg SubCUTAneous Q24H    ondansetron (ZOFRAN) injection 4 mg  4 mg IntraVENous Q4H PRN    acetaminophen (TYLENOL) tablet 650 mg  650 mg Oral Q4H PRN     ______________________________________________________________________  EXPECTED LENGTH OF STAY: 2d 14h  ACTUAL LENGTH OF STAY:          2                 Bekah Looney MD

## 2022-05-18 LAB
ANION GAP SERPL CALC-SCNC: 6 MMOL/L (ref 5–15)
BASOPHILS # BLD: 0.1 K/UL (ref 0–0.1)
BASOPHILS NFR BLD: 1 % (ref 0–1)
BUN SERPL-MCNC: 25 MG/DL (ref 6–20)
BUN/CREAT SERPL: 22 (ref 12–20)
CALCIUM SERPL-MCNC: 9.3 MG/DL (ref 8.5–10.1)
CHLORIDE SERPL-SCNC: 104 MMOL/L (ref 97–108)
CO2 SERPL-SCNC: 26 MMOL/L (ref 21–32)
CREAT SERPL-MCNC: 1.14 MG/DL (ref 0.55–1.02)
DIFFERENTIAL METHOD BLD: ABNORMAL
EOSINOPHIL # BLD: 0.2 K/UL (ref 0–0.4)
EOSINOPHIL NFR BLD: 2 % (ref 0–7)
ERYTHROCYTE [DISTWIDTH] IN BLOOD BY AUTOMATED COUNT: 14.1 % (ref 11.5–14.5)
GLUCOSE BLD STRIP.AUTO-MCNC: 160 MG/DL (ref 65–117)
GLUCOSE BLD STRIP.AUTO-MCNC: 178 MG/DL (ref 65–117)
GLUCOSE BLD STRIP.AUTO-MCNC: 187 MG/DL (ref 65–117)
GLUCOSE SERPL-MCNC: 171 MG/DL (ref 65–100)
HCT VFR BLD AUTO: 35.5 % (ref 35–47)
HGB BLD-MCNC: 11.4 G/DL (ref 11.5–16)
IMM GRANULOCYTES # BLD AUTO: 0 K/UL (ref 0–0.04)
IMM GRANULOCYTES NFR BLD AUTO: 0 % (ref 0–0.5)
LYMPHOCYTES # BLD: 2.1 K/UL (ref 0.8–3.5)
LYMPHOCYTES NFR BLD: 18 % (ref 12–49)
MCH RBC QN AUTO: 28.6 PG (ref 26–34)
MCHC RBC AUTO-ENTMCNC: 32.1 G/DL (ref 30–36.5)
MCV RBC AUTO: 89 FL (ref 80–99)
MONOCYTES # BLD: 0.9 K/UL (ref 0–1)
MONOCYTES NFR BLD: 8 % (ref 5–13)
NEUTS SEG # BLD: 8.3 K/UL (ref 1.8–8)
NEUTS SEG NFR BLD: 71 % (ref 32–75)
NRBC # BLD: 0 K/UL (ref 0–0.01)
NRBC BLD-RTO: 0 PER 100 WBC
PLATELET # BLD AUTO: 286 K/UL (ref 150–400)
PMV BLD AUTO: 10.1 FL (ref 8.9–12.9)
POTASSIUM SERPL-SCNC: 3.5 MMOL/L (ref 3.5–5.1)
RBC # BLD AUTO: 3.99 M/UL (ref 3.8–5.2)
SERVICE CMNT-IMP: ABNORMAL
SODIUM SERPL-SCNC: 136 MMOL/L (ref 136–145)
WBC # BLD AUTO: 11.7 K/UL (ref 3.6–11)

## 2022-05-18 PROCEDURE — 96372 THER/PROPH/DIAG INJ SC/IM: CPT

## 2022-05-18 PROCEDURE — 65270000029 HC RM PRIVATE

## 2022-05-18 PROCEDURE — 85025 COMPLETE CBC W/AUTO DIFF WBC: CPT

## 2022-05-18 PROCEDURE — 80048 BASIC METABOLIC PNL TOTAL CA: CPT

## 2022-05-18 PROCEDURE — 97535 SELF CARE MNGMENT TRAINING: CPT

## 2022-05-18 PROCEDURE — 74011636637 HC RX REV CODE- 636/637: Performed by: INTERNAL MEDICINE

## 2022-05-18 PROCEDURE — 74011250637 HC RX REV CODE- 250/637: Performed by: INTERNAL MEDICINE

## 2022-05-18 PROCEDURE — 36415 COLL VENOUS BLD VENIPUNCTURE: CPT

## 2022-05-18 PROCEDURE — 74011250636 HC RX REV CODE- 250/636: Performed by: INTERNAL MEDICINE

## 2022-05-18 PROCEDURE — 82962 GLUCOSE BLOOD TEST: CPT

## 2022-05-18 PROCEDURE — 97530 THERAPEUTIC ACTIVITIES: CPT

## 2022-05-18 PROCEDURE — G0378 HOSPITAL OBSERVATION PER HR: HCPCS

## 2022-05-18 PROCEDURE — 65270000046 HC RM TELEMETRY

## 2022-05-18 RX ADMIN — METOPROLOL TARTRATE 50 MG: 50 TABLET ORAL at 09:42

## 2022-05-18 RX ADMIN — ENOXAPARIN SODIUM 40 MG: 100 INJECTION SUBCUTANEOUS at 17:04

## 2022-05-18 RX ADMIN — MINOXIDIL 10 MG: 2.5 TABLET ORAL at 11:07

## 2022-05-18 RX ADMIN — Medication 2 UNITS: at 09:42

## 2022-05-18 RX ADMIN — Medication 2 UNITS: at 13:18

## 2022-05-18 RX ADMIN — AMLODIPINE BESYLATE 10 MG: 5 TABLET ORAL at 09:42

## 2022-05-18 RX ADMIN — METOPROLOL TARTRATE 50 MG: 50 TABLET ORAL at 18:44

## 2022-05-18 RX ADMIN — MINOXIDIL 10 MG: 2.5 TABLET ORAL at 19:11

## 2022-05-18 RX ADMIN — Medication 2 UNITS: at 17:04

## 2022-05-18 NOTE — PROGRESS NOTES
Problem: Self Care Deficits Care Plan (Adult)  Goal: *Acute Goals and Plan of Care (Insert Text)  Description: FUNCTIONAL STATUS PRIOR TO ADMISSION: Patient was independent and active without use of DME. Patient Min-Mod A for basic ADLs (mostly bathing), assist for IADLs. Pateint with baseline cognitive impairment, attends an adult day care program 5 days/week     HOME SUPPORT: The patient lived with her family who assists PRN. Occupational Therapy Goals  Initiated 5/17/2022  1. Patient will perform at least one standing grooming task with minimal assistance/contact guard assist within 7 day(s). 2.  Patient will perform bathing with minimal assistance/contact guard assist within 7 day(s). 3.  Patient will perform upper and lower body dressing with minimal assistance/contact guard assist within 7 day(s). 4.  Patient will perform toilet transfers with minimal assistance/contact guard assist within 7 day(s). 5.  Patient will perform all aspects of toileting with minimal assistance/contact guard assist within 7 day(s). 6.  Patient will participate in upper extremity therapeutic exercise/activities with no more than minimal assistance/contact guard assist for 10 minutes within 7 day(s). 7.  Patient will utilize energy conservation techniques during functional activities with verbal and visual cues within 7 day(s). Outcome: Progressing Towards Goal   OCCUPATIONAL THERAPY TREATMENT  Patient: Puja Oconnell (39 y.o. female)  Date: 5/18/2022  Diagnosis: Altered mental state [R41.82] <principal problem not specified>       Precautions: Fall  Chart, occupational therapy assessment, plan of care, and goals were reviewed. ASSESSMENT  Patient continues with skilled OT services and is progressing towards goals. Nursing cleared for therapy. Received in bed with confusion. Oriented to self, place and month. Slow processing of basic commands and needing additional time and cues to open eyes through out session. EOB donned socks with CGA and donned gown with min A. Initial standing with min A x2 with decreased BP with standing, see below. With second attempt for sitting CGA x2 and mod Ax2 with HHA to chair secondary to impaired coordination, balance and safety. Pulse  BP               05/18/22 1140 90 Sitting 138/72   05/18/22 1135 (!) 125 Standing (!) 116/22   05/18/22 1130 -- Sitting (!) 162/83          Current Level of Function Impacting Discharge (ADLs): UB care min A, socks SBA    Other factors to consider for discharge: Patient was home with son and attended daycenter. Patient is currently requiring x2 assist for self care transfers. Recommend SNF for additional rehab. PLAN :  Patient continues to benefit from skilled intervention to address the above impairments. Continue treatment per established plan of care to address goals. Recommend with staff: x2 assist to chair for meals and BSC    Recommend next OT session: per POC    Recommendation for discharge: (in order for the patient to meet his/her long term goals)  Therapy up to 5 days/week in SNF setting    This discharge recommendation:  Has been made in collaboration with the attending provider and/or case management    IF patient discharges home will need the following DME: TBD progression and dc plans       SUBJECTIVE:   Patient stated .      OBJECTIVE DATA SUMMARY:   Cognitive/Behavioral Status:  Neurologic State: Alert;Eyes open spontaneously  Orientation Level: Oriented to place;Oriented to person;Disoriented to situation;Disoriented to time  Cognition: Follows commands;Decreased command following;Decreased attention/concentration             Functional Mobility and Transfers for ADLs:  Bed Mobility:  Rolling: Stand-by assistance; Additional time  Supine to Sit: Stand-by assistance; Additional time  Sit to Supine:  (ended session in chair)  Scooting: Contact guard assistance; Additional time    Transfers:  Sit to Stand: Minimum assistance;Assist x2     Bed to Chair: Moderate assistance;Assist x2    Balance:  Sitting: High guard  Standing: Impaired  Standing - Static: Fair  Standing - Dynamic : Poor    ADL Intervention:       Patient instructed and indicated understanding the benefits of maintaining activity tolerance, functional mobility, and independence with self care tasks during acute stay  to ensure safe return home and to baseline. Encouraged patient to increase frequency and duration OOB, be out of bed for all meals, perform daily ADLs (as approved by RN/MD regarding bathing etc), and performing functional mobility to/from bathroom. Upper Body [address]: Minimum  assistance    Lower Body Dressing Assistance  Socks: Contact guard assistance    Pain:  No c/o pain    Activity Tolerance:   Fair, orthostatic hypension    After treatment patient left in no apparent distress:   Sitting in chair, Call bell within reach, and Bed / chair alarm activated    COMMUNICATION/COLLABORATION:   The patients plan of care was discussed with: Physical therapist and Registered nurse.      Mayra Blizzard, OT  Time Calculation: 29 mins

## 2022-05-18 NOTE — ROUTINE PROCESS
Bedside and Verbal shift change report given to Monroe Lowery RN (oncoming nurse) by Blair Pallas, RN (offgoing nurse). Report included the following information SBAR, Kardex, MAR, Cardiac Rhythm NSR-ST and Dual Neuro Assessment.

## 2022-05-18 NOTE — PROGRESS NOTES
Unable to complete assessment with patient due to condition. CM attempted to reach patient's emergency contact, Jonathan Bourne to complete initial assessment on patient, VM Left. Per chart review, patient admitted from [address] [place] [address]. CM called [phone], spoke with , requesting call back from MARIANO Szymanski) RD Leonard.

## 2022-05-18 NOTE — PROGRESS NOTES
Problem: Mobility Impaired (Adult and Pediatric)  Goal: *Acute Goals and Plan of Care (Insert Text)  Description: FUNCTIONAL STATUS PRIOR TO ADMISSION: Pt poor historian, reports she was independent for all mobility and family assisted with some ADLs, but had conflicting responses to PLOF questions. HOME SUPPORT PRIOR TO ADMISSION: Pt lives with son, daughter in law, and grandson in a trailer. She attends adult day care 5x/week. Unclear how much assistance pt was requiring. Physical Therapy Goals  Initiated 5/17/2022  1. Patient will move from supine to sit and sit to supine , scoot up and down, and roll side to side in bed with independence within 7 day(s). 2.  Patient will transfer from bed to chair and chair to bed with CGA using the least restrictive device within 7 day(s). 3.  Patient will perform sit to stand with CGA within 7 day(s). 4.  Patient will ambulate with Yasemin for 50 feet with the least restrictive device within 7 day(s). 5.  Patient will ascend/descend 3 stairs with bilateral handrail(s) with Yasemin within 7 day(s). Outcome: Progressing Towards Goal  PHYSICAL THERAPY TREATMENT  Patient: Jyothi Leung (35 y.o. female)  Date: 5/18/2022  Diagnosis: Altered mental state [R41.82] <principal problem not specified>       Precautions: Fall  Chart, physical therapy assessment, plan of care and goals were reviewed. ASSESSMENT  Patient continues with skilled PT services and is progressing slowly towards goals. Pt continues to present with impaired balance, decreased activity tolerance, impaired cognition (baseline), orthostatic hypotension, and decreased insight into deficits s/p admission for AMS. Pt initially appeared more confused upon therapy arrival (removing gown, pulling off tele leads, not opening eyes or following commands) but with increased time and prompting, able to actively participate in session. Pt SBA for bed mobility with additional time and VCs for sequencing.  Pt up to Yasemin x2 for sit<>stand transfers. Pt took a few steps bed>chair with modAx2. Pt with initial BP drop from sit>stand (see below), that improved with standing exercise. Ended session with patient in chair with all needs met. Recommending IPR upon discharge. If home, HHPT with assist of 2 for OOB mobility. Vitals:    05/18/22 1130 05/18/22 1135 05/18/22 1140   BP: (!) 162/83 (!) 116/22 138/72   BP 1 Location: Right upper arm Right upper arm Right upper arm   BP Patient Position: Sitting Standing Sitting   Pulse:  (!) 125 90         Current Level of Function Impacting Discharge (mobility/balance): modAx2 bed>chair transfer    Other factors to consider for discharge: baseline impaired cognition, decreased safety awareness, fall risk, hx CVA         PLAN :  Patient continues to benefit from skilled intervention to address the above impairments. Continue treatment per established plan of care. to address goals. Recommendation for discharge: (in order for the patient to meet his/her long term goals)  Therapy 3 hours per day 5-7 days per week  If home, HHPT with assist of 2 for OOB mobility. This discharge recommendation:  Has not yet been discussed the attending provider and/or case management    IF patient discharges home will need the following DME: to be determined (TBD)       SUBJECTIVE:   Patient stated oh jessica.     OBJECTIVE DATA SUMMARY:   Critical Behavior:  Neurologic State: Alert,Eyes open spontaneously  Orientation Level: Oriented to place,Oriented to person,Disoriented to situation,Disoriented to time  Cognition: Follows commands,Decreased command following,Decreased attention/concentration  Safety/Judgement: Decreased awareness of environment,Decreased awareness of need for assistance,Decreased awareness of need for safety,Decreased insight into deficits  Functional Mobility Training:  Bed Mobility:  Rolling: Stand-by assistance; Additional time  Supine to Sit: Stand-by assistance; Additional time  Sit to Supine:  (ended session in chair)  Scooting: Contact guard assistance; Additional time    Transfers:  Sit to Stand: Minimum assistance;Assist x2  Stand to Sit: Minimum assistance;Assist x2  Bed to Chair: Moderate assistance;Assist x2    Balance:  Sitting: High guard  Standing: Impaired  Standing - Static: Fair  Standing - Dynamic : Poor     Activity Tolerance:   Fair    After treatment patient left in no apparent distress:   Sitting in chair, Call bell within reach, and Bed / chair alarm activated    COMMUNICATION/COLLABORATION:   The patients plan of care was discussed with: Occupational therapist and Registered nurse. Regarding student involvement in patient care:  A student participated in this treatment session. Per CMS Medicare statements and APTA guidelines I certify that the following was true:  1. I was present and directly observed the entire session. 2. I made all skilled judgments and clinical decisions regarding care. 3. I am the practitioner responsible for assessment, treatment, and documentation.     Truman Moreau, ELIEZER   Time Calculation: 32 mins

## 2022-05-18 NOTE — PROGRESS NOTES
Problem: Pressure Injury - Risk of  Goal: *Prevention of pressure injury  Description: Document Jacek Scale and appropriate interventions in the flowsheet. Outcome: Progressing Towards Goal  Note: Pressure Injury Interventions:  Sensory Interventions: Assess changes in LOC,Discuss PT/OT consult with provider,Float heels,Keep linens dry and wrinkle-free    Moisture Interventions: Absorbent underpads,Check for incontinence Q2 hours and as needed,Internal/External urinary devices    Activity Interventions: PT/OT evaluation,Increase time out of bed    Mobility Interventions: PT/OT evaluation,Turn and reposition approx. every two hours(pillow and wedges)    Nutrition Interventions: Document food/fluid/supplement intake    Friction and Shear Interventions: Lift sheet,Transferring/repositioning devices                Problem: Patient Education: Go to Patient Education Activity  Goal: Patient/Family Education  Outcome: Progressing Towards Goal     Problem: Falls - Risk of  Goal: *Absence of Falls  Description: Document Toshia Fall Risk and appropriate interventions in the flowsheet.   Outcome: Progressing Towards Goal  Note: Fall Risk Interventions:  Mobility Interventions: Communicate number of staff needed for ambulation/transfer,Patient to call before getting OOB,PT Consult for mobility concerns,OT consult for ADLs    Mentation Interventions: Adequate sleep, hydration, pain control,Bed/chair exit alarm    Medication Interventions: Patient to call before getting OOB,Teach patient to arise slowly,Evaluate medications/consider consulting pharmacy,Bed/chair exit alarm    Elimination Interventions: Call light in reach,Bed/chair exit alarm,Patient to call for help with toileting needs,Toileting schedule/hourly rounds              Problem: Patient Education: Go to Patient Education Activity  Goal: Patient/Family Education  Outcome: Progressing Towards Goal     Problem: Patient Education: Go to Patient Education Activity  Goal: Patient/Family Education  Outcome: Progressing Towards Goal     Problem: Patient Education: Go to Patient Education Activity  Goal: Patient/Family Education  Outcome: Progressing Towards Goal     Problem: Discharge Planning  Goal: *Discharge to safe environment  Outcome: Progressing Towards Goal

## 2022-05-18 NOTE — PROGRESS NOTES
[facility] Adult  Hospitalist Group                                                                                          Hospitalist Progress Note  Corey Garcia MD  Answering service: [phone] OR 36 from in house phone        Date of Service:  2022  NAME:  Margaret Marks  :  1955  MRN:  [id number]      Admission Summary:   Copied form admit: Elijah Box is a 73 y.o. female who presents with        Pt admitted 2n2 alt mental status     On examining pt she awoke form a deep sleep to give appropriate answers as she could; pt claims poor memory; and not aware of her meds or of her trip to hospital; pt is coming from an adult day care facility on admission, a call to a relative is attempted but no answer.      Pt was able to give ROS noting no issues; the exam was unremarkable except for BP being up and her initial marked somnolence that resolved nearly spontaneously and her evident dementia.      Pt's chart/meds reviewed; There exist a strong likelihood that pt is over medicated and this contituting a risk for findings; pt evidently fell the evening before admission, the CT of head neg and pt w/o neck pain on admission.      Full note to follow but for now limit pt's meds to alternative  bp management meds ; allow diet; obtain PT/OT eval, cover for BS's w SSI; fall precautions and Lovenox for DVT PPX.           The patient denies any headache, blurry vision, sore throat, trouble swallowing, trouble with speech, chest pain, SOB, cough, fever, chills, N/V/D, abd pain, urinary symptoms, constipation, recent travels, sick contacts, focal or generalized neurological symptoms, falls, injuries, rashes, contact with COVID-19 diagnosed patients, hematemesis, melena, hemoptysis, hematuria, rashes, denies starting any new medications and denies any other concerns or problems besides as mentioned above.    \"    Interval history / Subjective:     2022 :    Off meds of sedation/alt mental issues, pt returns to near  baseline   For PT /OT eval    For SNF placement    Goals continue:     1. Simple, but effective,  bp management program of meds     2. Safe discharge   3. Simplified shorter medicine program without sedating/mind altering meds as  Can accomplish    Maria Bender as below        Assessment & Plan:     Dementia,  ;   - Stable     [facility] pharmacy;   - Addressed w simplified med regimen, alfredo off most PTA meds     HTN;   - Meds adjusted avoiding clonidine but allowing BB  Controled     Alt mental status, 2n2 poly pharmacy fatemehley ; addressed by med reduction to essential   - Back to essential baseline     Code status: full   DVT prophylaxis: Lovenox        dipo [facility]       Hospital Problems  Never Reviewed          Codes Class Noted POA    Altered mental state ICD-10-CM: R41.82  ICD-9-CM: 780.97  5/16/2022 Unknown                  After personally interviewing pt at bedside the following is noted on 5/18/2022 :    Review of Systems   Reason unable to perform ROS: dementia. I had a face to face encounter with patient on 5/18/2022 at bedside for the following physical exam:     PHYSICAL EXAM:    Visit Vitals  /79 (BP 1 Location: Right upper arm, BP Patient Position: At rest;Sitting)   Pulse 96   Temp 97.6 °F (36.4 °C)   Resp 18   Ht 5' 11\" (1.803 m)   Wt 88.2 kg (194 lb 7.1 oz)   SpO2 98%   BMI 27.12 kg/m²          Physical Exam  Constitutional:       General: She is not in acute distress. Appearance: She is obese. She is not ill-appearing, toxic-appearing or diaphoretic. HENT:      Right Ear: External ear normal.      Left Ear: External ear normal.      Nose: Nose normal.      Mouth/Throat:      Mouth: Mucous membranes are dry. Pharynx: Oropharynx is clear. Eyes:      Extraocular Movements: Extraocular movements intact. Conjunctiva/sclera: Conjunctivae normal.      Pupils: Pupils are equal, round, and reactive to light.    Cardiovascular:      Rate and Rhythm: Normal rate and regular rhythm. Pulmonary:      Effort: Pulmonary effort is normal.      Breath sounds: Normal breath sounds. Abdominal:      General: Abdomen is flat. Bowel sounds are normal.      Palpations: Abdomen is soft. Musculoskeletal:         General: No swelling or deformity. Cervical back: Normal range of motion and neck supple. Skin:     Coloration: Skin is not jaundiced or pale. Neurological:      General: No focal deficit present. Mental Status: Mental status is at baseline. Comments: Dementia    Psychiatric:         Mood and Affect: Mood normal.         Behavior: Behavior normal.                     Data Review:    Review and/or order of clinical lab test      Labs:     Recent Labs     05/18/22  0101 05/16/22  1233   WBC 11.7* 9.1   HGB 11.4* 10.2*   HCT 35.5 32.9*    234     Recent Labs     05/18/22  0101 05/16/22  1233    138   K 3.5 4.8    106   CO2 26 25   BUN 25* 33*   CREA 1.14* 1.49*   * 150*   CA 9.3 9.7   MG  --  1.7     Recent Labs     05/16/22  1233   ALT 19   AP 51   TBILI 0.5   TP 7.2   ALB 3.5   GLOB 3.7     No results for input(s): INR, PTP, APTT, INREXT, INREXT in the last 72 hours. No results for input(s): FE, TIBC, PSAT, FERR in the last 72 hours. No results found for: FOL, RBCF   No results for input(s): PH, PCO2, PO2 in the last 72 hours. No results for input(s): CPK, CKNDX, TROIQ in the last 72 hours.     No lab exists for component: CPKMB  No results found for: CHOL, CHOLX, CHLST, CHOLV, HDL, HDLP, LDL, LDLC, DLDLP, TGLX, TRIGL, TRIGP, CHHD, CHHDX  Lab Results   Component Value Date/Time    Glucose (POC) 187 (H) 05/18/2022 04:46 PM    Glucose (POC) 178 (H) 05/18/2022 11:58 AM    Glucose (POC) 160 (H) 05/18/2022 07:46 AM    Glucose (POC) 206 (H) 05/17/2022 08:44 PM    Glucose (POC) 166 (H) 05/17/2022 04:58 PM     Lab Results   Component Value Date/Time    Color YELLOW/STRAW 05/16/2022 12:49 PM    Appearance CLEAR 05/16/2022 12:49 PM    Specific gravity 1.009 05/16/2022 12:49 PM    pH (UA) 5.0 05/16/2022 12:49 PM    Protein Negative 05/16/2022 12:49 PM    Glucose Negative 05/16/2022 12:49 PM    Ketone Negative 05/16/2022 12:49 PM    Bilirubin Negative 05/16/2022 12:49 PM    Urobilinogen 0.2 05/16/2022 12:49 PM    Nitrites Negative 05/16/2022 12:49 PM    Leukocyte Esterase Negative 05/16/2022 12:49 PM    Epithelial cells FEW 05/16/2022 12:49 PM    Bacteria 1+ (A) 05/16/2022 12:49 PM    WBC 0-4 05/16/2022 12:49 PM    RBC 0-5 05/16/2022 12:49 PM         Medications Reviewed:     Current Facility-Administered Medications   Medication Dose Route Frequency    hydrALAZINE (APRESOLINE) 20 mg/mL injection 10 mg  10 mg IntraVENous Q6H PRN    minoxidiL (LONITEN) tablet 10 mg  10 mg Oral BID    amLODIPine (NORVASC) tablet 10 mg  10 mg Oral DAILY    metoprolol tartrate (LOPRESSOR) tablet 50 mg  50 mg Oral BID    ziprasidone (GEODON) 10 mg in sterile water (preservative free) 0.5 mL injection  10 mg IntraMUSCular Q12H PRN    insulin lispro (HUMALOG) injection   SubCUTAneous TIDAC    glucose chewable tablet 16 g  4 Tablet Oral PRN    glucagon (GLUCAGEN) injection 1 mg  1 mg IntraMUSCular PRN    dextrose 10 % infusion 0-250 mL  0-250 mL IntraVENous PRN    enoxaparin (LOVENOX) injection 40 mg  40 mg SubCUTAneous Q24H    ondansetron (ZOFRAN) injection 4 mg  4 mg IntraVENous Q4H PRN    acetaminophen (TYLENOL) tablet 650 mg  650 mg Oral Q4H PRN     ______________________________________________________________________  EXPECTED LENGTH OF STAY: 2d 14h  ACTUAL LENGTH OF STAY:          2                 Tamara Harvey MD

## 2022-05-18 NOTE — PROGRESS NOTES
Transition of Care Plan   RUR- Low 9%   DISPOSITION: SNF - [facility] - pending insurance auth   F/U with PCP/Specialist     Transport: [facility]    Reason for Admission:  Lethargy                      RUR Score:          9%           Plan for utilizing home health:     No  Hx of home health, therapy recommending higher level of care    PCP: First and Last name:  Sara Duarte MD     Name of Practice:    Are you a current patient: Yes/No: Yes   Approximate date of last visit:    Can you participate in a virtual visit with your PCP:                     Current Advanced Directive/Advance Care Plan: Full Code      Healthcare Decision Maker: Kenn Velázquez is son, Rosa Garcia - however daughter in law, Alexy Elliott is emergency contact. Deniz England states that she makes most [facility] decisions but does not have POA. Patient's daughter in law states that her and her spouse share a phone, CM has requested that spouse reach out to this CM to confirm that patient's daughter-in-law is OK to make decisions for patient. Click here to complete [address] including selection of the Healthcare Decision Maker Relationship (ie \"Primary\")                             Transition of Care Plan:                      CM completed initial assessment with patient's daughter-in-law, Alexy Elliott due to patient condition. Living situation: lives with son, daughter-in-law and grandchildren   ADLs: needs total assist with all ADLs  DME: None  Previous IPR/SNF: None  Previous home health: none   Demographics: confirmed, [employer] Medicare/Medicaid insured  Main point of contact: Alexy Elliott [phone]    Patient [facility] [address] [facility] in [facility] and is home with her family every weekend. CM to follow patient progress and assist as recommended with DEANNA plan. Care Management Interventions  PCP Verified by CM:  Yes  Palliative Care Criteria Met (RRAT>21 & CHF Dx)?: No  Mode of Transport at Discharge: BLS  Transition of Care Consult (CM Consult): Discharge Planning  MyChart Signup: Yes  Discharge Durable Medical Equipment: No  Health Maintenance Reviewed: Yes  Physical Therapy Consult: Yes  Occupational Therapy Consult: Yes  Speech Therapy Consult: Yes  Support Systems: Child(antionette)  Confirm Follow Up Transport: Other (see comment) (bls)  Discharge Location  Patient Expects to be Discharged to[de-identified] Skilled nursing facility    Transition of Care Plan: Dr. Rosa Farfan recommending SNF for discharge. Patient's family is open to both [facility] and [place] - referrals sent in [employer]. The Plan for Transition of Care is related to the following treatment goals: SNF    The Patient and/or patient representative, Yamileth Carpio was provided with a choice of provider and agrees  with the discharge plan. Yes [x] No []    A Freedom of choice list was provided with basic dialogue that supports the patient's individualized plan of care/goals and shares the quality data associated with the providers. Yes [x] No []    3:36pm: [facility] has accepted patient for SNF. CM requested auth to be started today. Patient will need covid test within 72 hours of discharge. Per Dr. Rosa Farfan, patient medically stable for discharge. KANA received return call from patient's son, Dusty Bales, who gave permission to discuss discharge plan with his wife and patient's daughter in law, Art Vargas. Zac Sawyer is also in agreement with SNF placement at [facility]    4:04pm: KANA received call from [facility] in Admissions with [facility], she is unable to locate patient's Medicare policy through [place]. KANA provided Medicare #[id number] that is on file, confirmed by patient's daughter in law. Zac Willard M.S.LINDY.

## 2022-05-18 NOTE — PROGRESS NOTES
[address] [facility] Adult  Hospitalist Group                                                                                          Hospitalist Progress Note  Kitty Hurd MD  Answering service: [phone] OR [phone] from in house phone        Date of Service:  2022  NAME:  Deirdre Landis  :  1955  MRN:  [id number]      Admission Summary:   Copied form admit: Sujatha Ayala is a 73 y.o. female who presents with        Pt admitted 2n2 alt mental status     On examining pt she awoke form a deep sleep to give appropriate answers as she could; pt claims poor memory; and not aware of her meds or of her trip to hospital; pt is coming from an adult day care facility on admission, a call to a relative is attempted but no answer.      Pt was able to give ROS noting no issues; the exam was unremarkable except for BP being up and her initial marked somnolence that resolved nearly spontaneously and her evident dementia.      Pt's chart/meds reviewed; There exist a strong likelihood that pt is over medicated and this contituting a risk for findings; pt evidently fell the evening before admission, the CT of head neg and pt w/o neck pain on admission.      Full note to follow but for now limit pt's meds to alternative  bp management meds ; allow diet; obtain PT/OT eval, cover for BS's w SSI; fall precautions and Lovenox for DVT PPX.           The patient denies any headache, blurry vision, sore throat, trouble swallowing, trouble with speech, chest pain, SOB, cough, fever, chills, N/V/D, abd pain, urinary symptoms, constipation, recent travels, sick contacts, focal or generalized neurological symptoms, falls, injuries, rashes, contact with COVID-19 diagnosed patients, hematemesis, melena, hemoptysis, hematuria, rashes, denies starting any new medications and denies any other concerns or problems besides as mentioned above. \"    Interval history / Subjective:      Continues to be stable.    Needs to be at baseline; close to it.  Position pending paperwork       Assessment & Plan:     Dementia,  ;   - Stable     Poly pharmacy;   - Addressed w simplified med regimen, alfredo off most PTA meds     HTN;   - Meds adjusted avoiding clonidine but allowing BB  Controled     Alt mental status, 2n2 poly pharmacy kate ; addressed by med reduction to essential   - Back to essential baseline     Code status: full   DVT prophylaxis: Lovenox        dipo SNF       Hospital Problems  Never Reviewed          Codes Class Noted POA    Altered mental state ICD-10-CM: R41.82  ICD-9-CM: 780.97  5/16/2022 Unknown                  After personally interviewing pt at bedside the following is noted on 5/18/2022 :    Review of Systems   Reason unable to perform ROS: dementia. I had a face to face encounter with patient on 5/18/2022 at bedside for the following physical exam:     PHYSICAL EXAM:    Visit Vitals  BP (!) 142/66 (BP 1 Location: Right upper arm, BP Patient Position: At rest)   Pulse 95   Temp 98.2 °F (36.8 °C)   Resp 18   Ht 5' 11\" (1.803 m)   Wt 88.2 kg (194 lb 7.1 oz)   SpO2 98%   BMI 27.12 kg/m²          Physical Exam  Constitutional:       General: She is not in acute distress. Appearance: She is obese. She is not ill-appearing, toxic-appearing or diaphoretic. HENT:      Right Ear: External ear normal.      Left Ear: External ear normal.      Nose: Nose normal.      Mouth/Throat:      Mouth: Mucous membranes are dry. Pharynx: Oropharynx is clear. Eyes:      Extraocular Movements: Extraocular movements intact. Conjunctiva/sclera: Conjunctivae normal.      Pupils: Pupils are equal, round, and reactive to light. Cardiovascular:      Rate and Rhythm: Normal rate and regular rhythm. Pulmonary:      Effort: Pulmonary effort is normal.      Breath sounds: Normal breath sounds. Abdominal:      General: Abdomen is flat. Bowel sounds are normal.      Palpations: Abdomen is soft.    Musculoskeletal: General: No swelling or deformity. Cervical back: Normal range of motion and neck supple. Skin:     Coloration: Skin is not jaundiced or pale. Neurological:      General: No focal deficit present. Mental Status: Mental status is at baseline. Comments: Dementia    Psychiatric:         Mood and Affect: Mood normal.         Behavior: Behavior normal.                     Data Review:    Review and/or order of clinical lab test      Labs:     Recent Labs     05/18/22  0101 05/16/22  1233   WBC 11.7* 9.1   HGB 11.4* 10.2*   HCT 35.5 32.9*    234     Recent Labs     05/18/22  0101 05/16/22  1233    138   K 3.5 4.8    106   CO2 26 25   BUN 25* 33*   CREA 1.14* 1.49*   * 150*   CA 9.3 9.7   MG  --  1.7     Recent Labs     05/16/22  1233   ALT 19   AP 51   TBILI 0.5   TP 7.2   ALB 3.5   GLOB 3.7     No results for input(s): INR, PTP, APTT, INREXT, INREXT in the last 72 hours. No results for input(s): FE, TIBC, PSAT, FERR in the last 72 hours. No results found for: FOL, RBCF   No results for input(s): PH, PCO2, PO2 in the last 72 hours. No results for input(s): CPK, CKNDX, TROIQ in the last 72 hours.     No lab exists for component: CPKMB  No results found for: CHOL, CHOLX, CHLST, CHOLV, HDL, HDLP, LDL, LDLC, DLDLP, TGLX, TRIGL, TRIGP, CHHD, CHHDX  Lab Results   Component Value Date/Time    Glucose (POC) 187 (H) 05/18/2022 04:46 PM    Glucose (POC) 178 (H) 05/18/2022 11:58 AM    Glucose (POC) 160 (H) 05/18/2022 07:46 AM    Glucose (POC) 206 (H) 05/17/2022 08:44 PM    Glucose (POC) 166 (H) 05/17/2022 04:58 PM     Lab Results   Component Value Date/Time    Color YELLOW/STRAW 05/16/2022 12:49 PM    Appearance CLEAR 05/16/2022 12:49 PM    Specific gravity 1.009 05/16/2022 12:49 PM    pH (UA) 5.0 05/16/2022 12:49 PM    Protein Negative 05/16/2022 12:49 PM    Glucose Negative 05/16/2022 12:49 PM    Ketone Negative 05/16/2022 12:49 PM    Bilirubin Negative 05/16/2022 12:49 PM Urobilinogen 0.2 05/16/2022 12:49 PM    Nitrites Negative 05/16/2022 12:49 PM    Leukocyte Esterase Negative 05/16/2022 12:49 PM    Epithelial cells FEW 05/16/2022 12:49 PM    Bacteria 1+ (A) 05/16/2022 12:49 PM    WBC 0-4 05/16/2022 12:49 PM    RBC 0-5 05/16/2022 12:49 PM         Medications Reviewed:     Current Facility-Administered Medications   Medication Dose Route Frequency    hydrALAZINE (APRESOLINE) 20 mg/mL injection 10 mg  10 mg IntraVENous Q6H PRN    minoxidiL (LONITEN) tablet 10 mg  10 mg Oral BID    amLODIPine (NORVASC) tablet 10 mg  10 mg Oral DAILY    metoprolol tartrate (LOPRESSOR) tablet 50 mg  50 mg Oral BID    ziprasidone (GEODON) 10 mg in sterile water (preservative free) 0.5 mL injection  10 mg IntraMUSCular Q12H PRN    insulin lispro (HUMALOG) injection   SubCUTAneous TIDAC    glucose chewable tablet 16 g  4 Tablet Oral PRN    glucagon (GLUCAGEN) injection 1 mg  1 mg IntraMUSCular PRN    dextrose 10 % infusion 0-250 mL  0-250 mL IntraVENous PRN    enoxaparin (LOVENOX) injection 40 mg  40 mg SubCUTAneous Q24H    ondansetron (ZOFRAN) injection 4 mg  4 mg IntraVENous Q4H PRN    acetaminophen (TYLENOL) tablet 650 mg  650 mg Oral Q4H PRN     ______________________________________________________________________  EXPECTED LENGTH OF STAY: 2d 14h  ACTUAL LENGTH OF STAY:          2                 Lanre Jones MD

## 2022-05-19 VITALS
HEIGHT: 71 IN | HEART RATE: 98 BPM | BODY MASS INDEX: 26.11 KG/M2 | DIASTOLIC BLOOD PRESSURE: 102 MMHG | RESPIRATION RATE: 19 BRPM | TEMPERATURE: 98.6 F | SYSTOLIC BLOOD PRESSURE: 139 MMHG | WEIGHT: 186.51 LBS | OXYGEN SATURATION: 98 %

## 2022-05-19 LAB
COVID-19 RAPID TEST, COVR: NOT DETECTED
GLUCOSE BLD STRIP.AUTO-MCNC: 151 MG/DL (ref 65–117)
GLUCOSE BLD STRIP.AUTO-MCNC: 195 MG/DL (ref 65–117)
GLUCOSE BLD STRIP.AUTO-MCNC: 223 MG/DL (ref 65–117)
SERVICE CMNT-IMP: ABNORMAL
SOURCE, COVRS: NORMAL

## 2022-05-19 PROCEDURE — 82962 GLUCOSE BLOOD TEST: CPT

## 2022-05-19 PROCEDURE — 74011250637 HC RX REV CODE- 250/637: Performed by: INTERNAL MEDICINE

## 2022-05-19 PROCEDURE — 96372 THER/PROPH/DIAG INJ SC/IM: CPT

## 2022-05-19 PROCEDURE — 97116 GAIT TRAINING THERAPY: CPT

## 2022-05-19 PROCEDURE — 87635 SARS-COV-2 COVID-19 AMP PRB: CPT

## 2022-05-19 PROCEDURE — 74011636637 HC RX REV CODE- 636/637: Performed by: INTERNAL MEDICINE

## 2022-05-19 PROCEDURE — 74011250636 HC RX REV CODE- 250/636: Performed by: INTERNAL MEDICINE

## 2022-05-19 PROCEDURE — G0378 HOSPITAL OBSERVATION PER HR: HCPCS

## 2022-05-19 PROCEDURE — 97530 THERAPEUTIC ACTIVITIES: CPT

## 2022-05-19 RX ORDER — AMLODIPINE BESYLATE 10 MG/1
10 TABLET ORAL DAILY
Qty: 30 TABLET | Refills: 0 | Status: SHIPPED
Start: 2022-05-20

## 2022-05-19 RX ORDER — METOPROLOL TARTRATE 50 MG/1
50 TABLET ORAL 2 TIMES DAILY
Qty: 60 TABLET | Refills: 0 | Status: SHIPPED
Start: 2022-05-19

## 2022-05-19 RX ORDER — MINOXIDIL 10 MG/1
10 TABLET ORAL 2 TIMES DAILY
Qty: 60 TABLET | Refills: 0 | Status: SHIPPED
Start: 2022-05-19

## 2022-05-19 RX ADMIN — Medication 3 UNITS: at 08:48

## 2022-05-19 RX ADMIN — METOPROLOL TARTRATE 50 MG: 50 TABLET ORAL at 08:49

## 2022-05-19 RX ADMIN — METOPROLOL TARTRATE 50 MG: 50 TABLET ORAL at 18:00

## 2022-05-19 RX ADMIN — MINOXIDIL 10 MG: 2.5 TABLET ORAL at 17:59

## 2022-05-19 RX ADMIN — AMLODIPINE BESYLATE 10 MG: 5 TABLET ORAL at 08:49

## 2022-05-19 RX ADMIN — Medication 3 UNITS: at 12:26

## 2022-05-19 RX ADMIN — ENOXAPARIN SODIUM 40 MG: 100 INJECTION SUBCUTANEOUS at 16:32

## 2022-05-19 RX ADMIN — Medication 2 UNITS: at 17:38

## 2022-05-19 RX ADMIN — MINOXIDIL 10 MG: 2.5 TABLET ORAL at 08:49

## 2022-05-19 NOTE — PROGRESS NOTES
Problem: Pressure Injury - Risk of  Goal: *Prevention of pressure injury  Description: Document Jacek Scale and appropriate interventions in the flowsheet. Outcome: Progressing Towards Goal  Note: Pressure Injury Interventions:  Sensory Interventions: Assess changes in LOC    Moisture Interventions: Absorbent underpads,Apply protective barrier, creams and emollients,Check for incontinence Q2 hours and as needed,Maintain skin hydration (lotion/cream)    Activity Interventions: PT/OT evaluation    Mobility Interventions: Assess need for specialty bed,HOB 30 degrees or less,PT/OT evaluation,Pressure redistribution bed/mattress (bed type),Turn and reposition approx. every two hours(pillow and wedges)    Nutrition Interventions: Document food/fluid/supplement intake,Offer support with meals,snacks and hydration    Friction and Shear Interventions: Apply protective barrier, creams and emollients,Minimize layers                Problem: Falls - Risk of  Goal: *Absence of Falls  Description: Document Toshia Fall Risk and appropriate interventions in the flowsheet.   Outcome: Progressing Towards Goal  Note: Fall Risk Interventions:  Mobility Interventions: Assess mobility with egress test,Bed/chair exit alarm,OT consult for ADLs,PT Consult for mobility concerns,PT Consult for assist device competence,Patient to call before getting OOB,Utilize walker, cane, or other assistive device    Mentation Interventions: Adequate sleep, hydration, pain control,Bed/chair exit alarm,Door open when patient unattended,Evaluate medications/consider consulting pharmacy,More frequent rounding,Reorient patient,Toileting rounds    Medication Interventions: Patient to call before getting OOB,Teach patient to arise slowly    Elimination Interventions: Bed/chair exit alarm,Call light in reach,Patient to call for help with toileting needs,Stay With Me (per policy),Toilet paper/wipes in reach,Toileting schedule/hourly rounds    History of Falls Interventions: Bed/chair exit alarm,Door open when patient unattended,Evaluate medications/consider consulting pharmacy

## 2022-05-19 NOTE — PROGRESS NOTES
0800 Bedside shift change report given to Rosita Vizcaino   (oncoming nurse) by Cabrera Vigil (offgoing nurse).  Report included the following information SBAR, Kardex, ED Summary, , Procedure Summary, Intake/Output, MAR, , Recent Results, Med Rec Status, and Cardiac Rhythm  SR/ST

## 2022-05-19 NOTE — PROGRESS NOTES
Transition of Care Plan   RUR- Low  9%   DISPOSITION: SNF - [facility]    F/U with PCP/Specialist     Transport: [facility] 6pm    Emergency contact: Ryan Joseph [phone]    CM barriers to discharge: None    [employer] has started insurance auth with patient's [employer] policy. Anticipate 48 hours for auth, per [facility] Admissions coordinator, Yudith Monzon [phone].    3:34pm: CM received call from Yudiht Monzon in Admissions at [facility], Meron Connell received for SNF. CM spoke with patient's daughter in law, Joe Marshall, and she is in agreement with discharge plan to SNF. Medicare pt has received, reviewed, and signed 2nd IM letter informing them of their right to appeal the discharge. Signed copy has been placed on pt bedside chart. Transition of Care Plan to SNF/Rehab    SNF/Rehab Transition:  Patient has been accepted to [facility] and meets criteria for admission. Patient will transported by [facility] and expected to leave at 1800. Communication to Patient/Family:  Met with patient and Meghanjames Jake and they are agreeable to the transition plan. Communication to SNF/Rehab:  Bedside RN, Juan David Boone, has been notified to update the transition plan to the facility and call report (phone number [phone] B side RN station). Discharge information has been updated on the AVS.     Discharge instructions to be fax'd to facility at [facility] # [phone]). Nursing Please include all hard scripts for controlled substances, med rec and dc summary, and AVS in packet.      Reviewed and confirmed with facility, [facility], can manage the patient care needs for the following:     Cody Woodruff with (X) only those applicable:    Medication:  [x]  Medications will be available at the facility  []  IV Antibiotics   []  Controlled Substance - hard copy to be sent with patient   [x]  Weekly Labs   Documents:  [x] Hard RX  [x] MAR  [x] Kardex  [x] AVS  [x]Transfer Summary  [x]Discharge   Equipment:  []  CPAP/BiPAP  []  Wound Vacuum  []  Brice or Urinary Device  []  PICC/Central Line  []  Nebulizer  []  Ventilator   Treatment:  []Isolation (for MRSA, VRE, etc.)  []Surgical Drain Management  []Tracheostomy Care  []Dressing Changes  []Dialysis with transportation and chair time   []PEG Care  []Oxygen  []Daily Weights for Heart Failure   Dietary:  []Any diet limitations  []Tube Feedings   []Total Parenteral Management (TPN)   Eligible for Medicaid Long Term Services and Supports  Yes:  [] Eligible for medical assistance or will become eligible within 180 days and UAI completed. [] Provider/Patient and/or support system has requested screening. [] UAI copy provided to patient or responsible party,   [] UAI unavailable at discharge will send once processed to SNF provider. [] UAI unavailable at discharged mailed to patient  No:   [] Private pay and is not financially eligible for Medicaid within the next 180 days. [] Reside out-of-state. [] A residents of a state owned/operated facility that is licensed  by [facility] or [facility]  [] Enrollment in [facility] hospice services  []  Medical [address]  [] Patient /Family declines to have screening completed or provide financial information for screening     Financial Resources:  Medicaid    [] Initiated and application pending   [x] Full coverage     Advanced Care Plan:  []Surrogate Decision Maker of Care  []POA  [x]Communicated Code Status FULL   Other       Natalie Hyman) [email or web address].

## 2022-05-19 NOTE — DISCHARGE SUMMARY
Discharge Summary     PATIENT ID: William Moulton  MRN: [id number]   YOB: 1955    DATE OF ADMISSION: 5/16/2022 11:59 AM    DATE OF DISCHARGE: 5/19/2022  PRIMARY CARE PROVIDER: Savage Reeder MD   DISCHARGING PROVIDER: Leilani Fields MD    To contact this individual call [phone] and ask the  to page. If unavailable ask to be transferred the Adult Hospitalist Department. CONSULTATIONS: None    PROCEDURES/SURGERIES: * No surgery found *    ADMITTING DIAGNOSES & HOSPITAL COURSE:   Copied from admit: \"Radha Steinberg is a 73 y. o. female who presents with      Pt admitted 2n2 alt mental status     On examining pt she awoke form a deep sleep to give appropriate answers as she could; pt claims poor memory; and not aware of her meds or of her trip to hospital; pt is coming from an adult day care facility on admission, a call to a relative is attempted but no answer.      Pt was able to give ROS noting no issues; the exam was unremarkable except for BP being up and her initial marked somnolence that resolved nearly spontaneously and her evident dementia.      Pt's chart/meds reviewed;  There exist a strong likelihood that pt is over medicated and this contituting a risk for findings; pt evidently fell the evening before admission, the CT of head neg and pt w/o neck pain on admission.      Full note to follow but for now limit pt's meds to alternative  bp management meds ; allow diet; obtain PT/OT eval, cover for BS's w SSI; fall precautions and Lovenox for DVT PPX. \"    DISCHARGE DIAGNOSES / PLAN:      Bradycardic episode 5/19- very brief, transient, resolved spontaneously  Likely vasovagal response during vomiting (valsalva) after eating something that \"made me sick\"  Cont to monitor for any recurrence  Tele strips are in paper chart     Poly pharmacy;   - Addressed w simplified med regimen, alfredo off most PTA meds      HTN; overall controlled, labile  - Meds adjusted avoiding clonidine but allowing BB     Alt mental status, 2n2 poly pharmacy likely- improving  addressed by med reduction to essential   - Back to essential baseline      Dementia, Stable      ADDITIONAL CARE RECOMMENDATIONS: f/u pcp, monitor BP/HR    PENDING TEST RESULTS:   At the time of discharge the following test results are still pending: none    Patient Instructions     FOLLOW UP APPOINTMENTS:    Follow-up Information     Follow up With Specialties Details Why Contact Info    Sara Duarte MD Internal Medicine Physician   [address] [employer] [phone]           DIET: Regular Diet    ACTIVITY: Activity as tolerated    NOTIFY YOUR PHYSICIAN FOR ANY OF THE FOLLOWING:   Fever over 101 degrees for 24 hours. Chest pain, shortness of breath, fever, chills, nausea, vomiting, diarrhea, change in mentation, falling, weakness, bleeding. Severe pain or pain not relieved by medications. Or, any other signs or symptoms that you may have questions about. DISPOSITION:    Home With:   OT  PT  HH  RN      x Long term SNF/Inpatient Rehab    Independent/assisted living    Hospice    Other:       PATIENT CONDITION AT DISCHARGE:   Functional status    Poor    x Deconditioned     Independent      Cognition     Lucid     Forgetful    x Dementia      Catheters/lines (plus indication)    Brice     PICC     PEG    x None      Code status    x Full code     DNR      PHYSICAL EXAMINATION AT DISCHARGE:  General:  Alert, cooperative, no distress  Lungs:   Clear to auscultation bilaterally, symmetric expansion, no respiratory distress  Chest wall:  No tenderness or deformity  Heart:   Regular rate and rhythm  Abdomen:   Soft, non-tender  Extremities: Extremities normal, atraumatic, no cyanosis or edema  Skin:  Skin color, texture, turgor normal. No rashes or lesions  Neurologic: CNII-XII intact. GRIMES.  A&Ox2-3 (can't state president or year, but correctly states month and day)    [address] DIAGNOSES:  Problem List as of 5/19/2022 Never Reviewed          Codes Class Noted - Resolved    Altered mental state ICD-10-CM: R41.82  ICD-9-CM: 780.97  5/16/2022 - Present        Recurrent depression ([facility] [address] ICD-10-CM: F33.9  ICD-9-CM: 296.30  1/12/2018 - Present              DISCHARGE MEDICATIONS:  Current Discharge Medication List      START taking these medications    Details   amLODIPine (NORVASC) 10 mg tablet Take 1 Tablet by mouth daily. Qty: 30 Tablet, Refills: 0  Start date: 5/20/2022      metoprolol tartrate (LOPRESSOR) 50 mg tablet Take 1 Tablet by mouth two (2) times a day. Qty: 60 Tablet, Refills: 0  Start date: 5/19/2022      minoxidiL (LONITEN) 10 mg tablet Take 1 Tablet by mouth two (2) times a day. Qty: 60 Tablet, Refills: 0  Start date: 5/19/2022         CONTINUE these medications which have NOT CHANGED    Details   donepeziL (ARICEPT) 5 mg tablet TAKE 1 TABLET BY MOUTH ONCE DAILY AT NIGHT  Qty: 30 Tablet, Refills: 2    Associated Diagnoses: Vascular dementia without behavioral disturbance (HCC)      memantine (NAMENDA) 10 mg tablet Take 1 Tablet by mouth two (2) times a day. Qty: 60 Tablet, Refills: 5    Associated Diagnoses: Vascular dementia without behavioral disturbance (HCC)      insulin aspart (NOVOLOG) 100 unit/mL injection by SubCUTAneous route. multivitamin (ONE A DAY) tablet Take 1 Tab by mouth daily. aspirin (ASPIRIN) 325 mg tablet Take 325 mg by mouth daily.          STOP taking these medications       PARoxetine (PAXIL) 20 mg tablet Comments:   Reason for Stopping:         QUEtiapine (SEROquel) 50 mg tablet Comments:   Reason for Stopping:         HYDROcodone-acetaminophen (NORCO)  mg tablet Comments:   Reason for Stopping:         spironolactone (ALDACTONE) 25 mg tablet Comments:   Reason for Stopping:         magnesium oxide (MAG-OX) 400 mg tablet Comments:   Reason for Stopping:         cloNIDine HCL (CATAPRES) 0.2 mg tablet Comments:   Reason for Stopping: DULoxetine (CYMBALTA) 60 mg capsule Comments:   Reason for Stopping:         carvedilol (COREG) 25 mg tablet Comments:   Reason for Stopping:         metFORMIN (GLUCOPHAGE) 500 mg tablet Comments:   Reason for Stopping:         insulin degludec (TRESIBA FLEXTOUCH U-100) 100 unit/mL (3 mL) inpn Comments:   Reason for Stopping:         furosemide (LASIX) 20 mg tablet Comments:   Reason for Stopping:         rosuvastatin (CRESTOR) 40 mg tablet Comments:   Reason for Stopping:             Greater than 30 minutes were spent with the patient on counseling and coordination of care    Signed:   Gudelia Otoole MD  5/19/2022  3:39 PM

## 2022-05-19 NOTE — PROGRESS NOTES
Problem: Mobility Impaired (Adult and Pediatric)  Goal: *Acute Goals and Plan of Care (Insert Text)  Description: FUNCTIONAL STATUS PRIOR TO ADMISSION: Pt poor historian, reports she was independent for all mobility and family assisted with some ADLs, but had conflicting responses to PLOF questions. HOME SUPPORT PRIOR TO ADMISSION: Pt lives with son, daughter in law, and grandson in a trailer. She attends adult day care 5x/week. Unclear how much assistance pt was requiring. Physical Therapy Goals  Initiated 5/17/2022  1. Patient will move from supine to sit and sit to supine , scoot up and down, and roll side to side in bed with independence within 7 day(s). 2.  Patient will transfer from bed to chair and chair to bed with CGA using the least restrictive device within 7 day(s). 3.  Patient will perform sit to stand with CGA within 7 day(s). 4.  Patient will ambulate with Yasemin for 50 feet with the least restrictive device within 7 day(s). 5.  Patient will ascend/descend 3 stairs with bilateral handrail(s) with Yasemin within 7 day(s). Outcome: Progressing Towards Goal     PHYSICAL THERAPY TREATMENT  Patient: William Moulton (17 y.o. female)  Date: 5/19/2022  Diagnosis: Altered mental state [R41.82] <principal problem not specified>      Precautions: Fall  Chart, physical therapy assessment, plan of care and goals were reviewed. ASSESSMENT  Patient is making progress towards goals but remains limited by impaired balance and endurance compared to baseline. Facilitated transfer to a [facility] initially with HHA requiring mod-max hand held support and concern for gait instability. Completed a 2nd trial using a RW and gait training completed x30' requiring min-mod assistance. Noted increased trunk sway and fluctuations in step length over a short distance. The patient was independent and active per reported prior level.  Recommend discharge to SNF rehab to address balance and safe mobility concerns before returning home with family. .     Current Level of Function Impacting Discharge (mobility/balance): min-mod for gait, impaired balance         PLAN :  Patient continues to benefit from skilled intervention to address the above impairments. Continue treatment per established plan of care. to address goals. Recommendation for discharge: (in order for the patient to meet his/her long term goals)  Therapy up to 5 days/week in SNF setting        IF patient discharges home will need the following DME: to be determined (TBD)       SUBJECTIVE:   Patient stated nothing contributory. OBJECTIVE DATA SUMMARY:   Critical Behavior:  Neurologic State: Alert,Confused  Orientation Level: Oriented to person,Oriented to place,Oriented to situation,Disoriented to time  Cognition: Follows commands  Safety/Judgement: Decreased awareness of environment,Decreased awareness of need for assistance,Decreased awareness of need for safety,Decreased insight into deficits  Functional Mobility Training:  Bed Mobility:  Rolling: Stand-by assistance  Supine to Sit: Stand-by assistance; Additional time (verbal cues)     Scooting: Contact guard assistance        Transfers:  Sit to Stand: Minimum assistance  Stand to Sit: Minimum assistance        Bed to Chair: Moderate assistance (heavy reliance on HHA)                    Balance:  Sitting: Intact  Standing: Impaired  Standing - Static: Fair  Standing - Dynamic : Fair;Poor (fair with RW)  Ambulation/Gait Training:  Distance (ft): 30 Feet (ft)  Assistive Device: Gait belt;Walker, rolling  Ambulation - Level of Assistance: Minimal assistance; Moderate assistance        Gait Abnormalities: Decreased step clearance;Trunk sway increased        Base of Support: Widened                             Stairs:               Therapeutic Exercises:     Pain Rating:      Activity Tolerance:   Fair  HR  BPM during gait  After treatment patient left in no apparent distress:   Sitting in chair and Call bell within reach    COMMUNICATION/COLLABORATION:   The patients plan of care was discussed with: Registered nurse.      Lissy Mcbride PT, DPT   Time Calculation: 27 mins

## 2022-05-19 NOTE — PROGRESS NOTES
The telemetry unit notified RN that patients heart rate was 29 then went up to 122, When RN entered room patient was alert and had vomited, bp elevated.  Dr Bre Last notified, strip in chart, and will continue to monitor patient

## 2022-05-19 NOTE — PROGRESS NOTES
Physician Progress Note      Larisa Xiong  [facility] #:                  [id number]  :                       1955  ADMIT DATE:       2022 11:59 AM  DISCH DATE:  RESPONDING  PROVIDER #:        Vicki Pritchard MD          QUERY TEXT:    Pt admitted with AMS . Pt noted to have abnormal renal function labs  If possible, please document in the progress notes and discharge summary if you are evaluating and/or treating any of the following: The medical record reflects the following:  Risk Factors: abnormal labs  Clinical Indicators:  2022 12:33  BUN: 33 (H)  Creatinine: 1.49 (H)  GFR est non-AA: 35 (L)  GFR est AA: 43 (L)    2022 01:01  BUN: 25 (H)  Creatinine: 1.14 (H)  GFR est non-AA: 48 (L)  GFR est AA: 58 (L)      Treatment: lab monitoring, 1 lt IVF bolus      Thank you,  Greta Dsouza RN, CDI, CRCR, CCDS  Certified Clinical   [phone]          Defined by Kidney Disease Improving Global Outcomes (KDIGO) clinical practice guideline for acute kidney injury:  -Increase in SCr by greater than or equal to 0.3 mg/dl within 48?hours; or  -Increase or decrease in SCr to greater than or equal to 1.5 times baseline, which is known or presumed to have occurred within the prior 7 days; or  -Urine volume < 0.5ml/kg/h for 6 hours  Options provided:  -- Acute kidney injury  -- Acute kidney failure  -- Other - I will add my own diagnosis  -- Disagree - Not applicable / Not valid  -- Disagree - Clinically unable to determine / Unknown  -- Refer to Clinical Documentation Reviewer    PROVIDER RESPONSE TEXT:    This patient has an Acute kidney injury. Query created by: Denson Siemens on 2022 3:13 PM      QUERY TEXT:    Pt admitted with AMS  Pt noted to have abnormal renal function labs and polypharmacy.  If possible, please document in the progress notes and discharge summary if you are evaluating and / or treating any of the following: The medical record reflects the following:  Risk Factors: AMS  Clinical Indicators:  5/16/2022 12:33  BUN: 33 (H)  Creatinine: 1.49 (H)  GFR est non-AA: 35 (L)  GFR est AA: 43 (L)    5/18/2022 01:01  BUN: 25 (H)  Creatinine: 1.14 (H)  GFR est non-AA: 48 (L)  GFR est AA: 58 (L)    5/17 PN  Pt cont to need to be in hospital;  BP management issues loom; Pt is else seemingly back to baseline; pt's  polypharmacy is largely removed/resolved    Treatment:lab monitoring, 1 lt IVF bolus, high fall risk precautions    Thank you,  Enrike Brito RN, CDI, CRCR, CCDS  Certified Clinical   [phone]  Options provided:  -- Drug-induced encephalopathy due to polypharmacy  -- Drug-induced encephalopathy, substance(s) unknown  -- Metabolic encephalopathy  -- Other - I will add my own diagnosis  -- Disagree - Not applicable / Not valid  -- Disagree - Clinically unable to determine / Unknown  -- Refer to Clinical Documentation Reviewer    PROVIDER RESPONSE TEXT:    This patient has drug-induced encephalopathy due to polypharmacy .     Query created by: Kelsey Metcalf on 5/18/2022 3:17 PM      Electronically signed by:  Moncho Nation MD 5/19/2022 3:38 PM

## 2022-05-19 NOTE — PROGRESS NOTES
[facility]  Hospitalist Group                                                                                          Hospitalist Progress Note  Ryan Sams MD  Answering service: [phone] OR [phone] from in house phone        Date of Service:  2022  NAME:  Elsie Ribera YOB: 1955  MRN:  [id number]      Admission Summary:   Copied form admit: Vani Díaz is a 73 y.o. female who presents with        Pt admitted 2n2 alt mental status     On examining pt she awoke form a deep sleep to give appropriate answers as she could; pt claims poor memory; and not aware of her meds or of her trip to hospital; pt is coming from an adult day care facility on admission, a call to a relative is attempted but no answer.      Pt was able to give ROS noting no issues; the exam was unremarkable except for BP being up and her initial marked somnolence that resolved nearly spontaneously and her evident dementia.      Pt's chart/meds reviewed; There exist a strong likelihood that pt is over medicated and this contituting a risk for findings; pt evidently fell the evening before admission, the CT of head neg and pt w/o neck pain on admission.      Full note to follow but for now limit pt's meds to alternative  bp management meds ; allow diet; obtain PT/OT eval, cover for BS's w SSI; fall precautions and Lovenox for DVT PPX.           The patient denies any headache, blurry vision, sore throat, trouble swallowing, trouble with speech, chest pain, SOB, cough, fever, chills, N/V/D, abd pain, urinary symptoms, constipation, recent travels, sick contacts, focal or generalized neurological symptoms, falls, injuries, rashes, contact with COVID-19 diagnosed patients, hematemesis, melena, hemoptysis, hematuria, rashes, denies starting any new medications and denies any other concerns or problems besides as mentioned above.    \"    Interval history / Subjective:      Continues to be stable   Seems close to baseline   A&Ox3 (except couldn't name year or president but knew correct month and day)   Pending placement     Was informed around 10am by RN that tele reported HR drop to 26 and then went up to 122; RN went to check on pt and found HR to be 104 and pt had vomited and BP was 185/72   I checked on pt later and she was eating lunch and said she felt well and stated she didn't like breakfast and \"it made me sick\" but that now she feels completely back to normal       Assessment & Plan:     Bradycardic episode- very brief, transient, resolved spontaneously  Sounds like vasovagal response during vomiting (valsalva) after eating something that \"made me sick\"  Cont to monitor for any recurrence  Tele strips are in paper chart    Poly pharmacy;   - Addressed w simplified med regimen, alfredo off most PTA meds     HTN; overall controlled  - Meds adjusted avoiding clonidine but allowing BB    Alt mental status, 2n2 poly pharmacy likely- improving  addressed by med reduction to essential   - Back to essential baseline     Dementia, Stable     Code status: full   DVT prophylaxis: Lovenox      dipo SNF  Awaiting bed     Hospital Problems  Never Reviewed          Codes Class Noted POA    Altered mental state ICD-10-CM: R41.82  ICD-9-CM: 780.97  5/16/2022 Unknown                  After personally interviewing pt at bedside the following is noted on 5/19/2022 :    Review of Systems   All other systems reviewed and are negative. I had a face to face encounter with patient on 5/19/2022 at bedside for the following physical exam:     PHYSICAL EXAM:    Visit Vitals  BP (!) 125/59 (BP 1 Location: Right upper arm, BP Patient Position: At rest)   Pulse (!) 107   Temp 99 °F (37.2 °C)   Resp 21   Ht 5' 11\" (1.803 m)   Wt 84.6 kg (186 lb 8.2 oz)   SpO2 98%   BMI 26.01 kg/m²          Physical Exam  Constitutional:       General: She is not in acute distress. Appearance: She is obese.  She is not ill-appearing, toxic-appearing or diaphoretic. HENT:      Right Ear: External ear normal.      Left Ear: External ear normal.      Nose: Nose normal.      Mouth/Throat:      Mouth: Mucous membranes are dry. Pharynx: Oropharynx is clear. Eyes:      Extraocular Movements: Extraocular movements intact. Conjunctiva/sclera: Conjunctivae normal.   Cardiovascular:      Rate and Rhythm: Normal rate and regular rhythm. Pulmonary:      Effort: Pulmonary effort is normal.      Breath sounds: Normal breath sounds. Abdominal:      General: There is no distension. Palpations: Abdomen is soft. Tenderness: There is no abdominal tenderness. Musculoskeletal:         General: No swelling or deformity. Cervical back: Normal range of motion and neck supple. Skin:     Coloration: Skin is not jaundiced or pale. Neurological:      General: No focal deficit present. Mental Status: She is oriented to person, place, and time. Mental status is at baseline. Comments: Can't state president or year, but knows correct month and date   Psychiatric:         Mood and Affect: Mood normal.         Behavior: Behavior normal.                     Data Review:    Review and/or order of clinical lab test      Labs:     Recent Labs     05/18/22  0101   WBC 11.7*   HGB 11.4*   HCT 35.5        Recent Labs     05/18/22  0101      K 3.5      CO2 26   BUN 25*   CREA 1.14*   *   CA 9.3     No results for input(s): ALT, AP, TBIL, TBILI, TP, ALB, GLOB, GGT, AML, LPSE in the last 72 hours. No lab exists for component: SGOT, GPT, AMYP, HLPSE  No results for input(s): INR, PTP, APTT, INREXT, INREXT in the last 72 hours. No results for input(s): FE, TIBC, PSAT, FERR in the last 72 hours. No results found for: FOL, RBCF   No results for input(s): PH, PCO2, PO2 in the last 72 hours. No results for input(s): CPK, CKNDX, TROIQ in the last 72 hours.     No lab exists for component: CPKMB  No results found for: CHOL, [address] [facility], CHLST, CHOLV, HDL, HDLP, LDL, LDLC, DLDLP, TGLX, TRIGL, TRIGP, CHHD, CHHDX  Lab Results   Component Value Date/Time    Glucose (POC) 195 (H) 05/19/2022 11:10 AM    Glucose (POC) 223 (H) 05/19/2022 06:18 AM    Glucose (POC) 187 (H) 05/18/2022 04:46 PM    Glucose (POC) 178 (H) 05/18/2022 11:58 AM    Glucose (POC) 160 (H) 05/18/2022 07:46 AM     Lab Results   Component Value Date/Time    Color YELLOW/STRAW 05/16/2022 12:49 PM    Appearance CLEAR 05/16/2022 12:49 PM    Specific gravity 1.009 05/16/2022 12:49 PM    pH (UA) 5.0 05/16/2022 12:49 PM    Protein Negative 05/16/2022 12:49 PM    Glucose Negative 05/16/2022 12:49 PM    Ketone Negative 05/16/2022 12:49 PM    Bilirubin Negative 05/16/2022 12:49 PM    Urobilinogen 0.2 05/16/2022 12:49 PM    Nitrites Negative 05/16/2022 12:49 PM    Leukocyte Esterase Negative 05/16/2022 12:49 PM    Epithelial cells FEW 05/16/2022 12:49 PM    Bacteria 1+ (A) 05/16/2022 12:49 PM    WBC 0-4 05/16/2022 12:49 PM    RBC 0-5 05/16/2022 12:49 PM         Medications Reviewed:     Current Facility-Administered Medications   Medication Dose Route Frequency    hydrALAZINE (APRESOLINE) 20 mg/mL injection 10 mg  10 mg IntraVENous Q6H PRN    minoxidiL (LONITEN) tablet 10 mg  10 mg Oral BID    amLODIPine (NORVASC) tablet 10 mg  10 mg Oral DAILY    metoprolol tartrate (LOPRESSOR) tablet 50 mg  50 mg Oral BID    ziprasidone (GEODON) 10 mg in sterile water (preservative free) 0.5 mL injection  10 mg IntraMUSCular Q12H PRN    insulin lispro (HUMALOG) injection   SubCUTAneous TIDAC    glucose chewable tablet 16 g  4 Tablet Oral PRN    glucagon (GLUCAGEN) injection 1 mg  1 mg IntraMUSCular PRN    dextrose 10 % infusion 0-250 mL  0-250 mL IntraVENous PRN    enoxaparin (LOVENOX) injection 40 mg  40 mg SubCUTAneous Q24H    ondansetron (ZOFRAN) injection 4 mg  4 mg IntraVENous Q4H PRN    acetaminophen (TYLENOL) tablet 650 mg  650 mg Oral Q4H PRN ______________________________________________________________________  EXPECTED LENGTH OF STAY: 2d 14h  ACTUAL LENGTH OF STAY:          Vera Reed MD

## 2022-06-07 NOTE — PROGRESS NOTES
Physician Progress Note      Marco Starr  [facility] #:                  [id number]  :                       1955  ADMIT DATE:       2022 11:59 AM  DISCH DATE:        2022 6:41 PM  RESPONDING  PROVIDER #:        Kirsten Hdye MD          QUERY TEXT:    Pt admitted with AMS. If possible, please document in progress notes and discharge summary the relationship etiology of AMS. The medical record reflects the following:  Risk Factors: leanna/polypharmacy  Clinical Indicators:  Pt admitted with AMS  Pt noted to have abnormal renal function labs and polypharmacy. 2022 12:33  BUN: 33 (H)  Creatinine: 1.49 (H)  GFR est non-AA: 35 (L)  GFR est AA: 43 (L)    Treatment: IV fluids, high fall risk precautions    Thank you,  Mirlande Pérez RN, CDI, CRCR, CCDS  Certified Clinical Documentation   [phone]  Options provided:  -- AMS  due to drug-induced encephalopathy  -- AMS  due to LEANNA  -- Other - I will add my own diagnosis  -- Disagree - Not applicable / Not valid  -- Disagree - Clinically unable to determine / Unknown  -- Refer to Clinical Documentation Reviewer    PROVIDER RESPONSE TEXT:    This patient has AMS due to drug-induced encephalopathy .     Query created by: Kusum Mota on 2022 11:32 AM      Electronically signed by:  Kirsten Hyde MD 2022 7:08 AM

## 2022-09-29 DIAGNOSIS — F01.50 VASCULAR DEMENTIA WITHOUT BEHAVIORAL DISTURBANCE (HCC): ICD-10-CM

## 2022-09-29 DIAGNOSIS — F91.9 BEHAVIOR DISTURBANCE: ICD-10-CM

## 2022-09-29 RX ORDER — QUETIAPINE FUMARATE 50 MG/1
TABLET, FILM COATED ORAL
Qty: 30 TABLET | Refills: 0 | OUTPATIENT
Start: 2022-09-29

## 2023-04-11 ENCOUNTER — FOLLOW UP (OUTPATIENT)
Dept: URBAN - METROPOLITAN AREA CLINIC 98 | Facility: CLINIC | Age: 68
End: 2023-04-11

## 2023-04-11 DIAGNOSIS — H47.011: ICD-10-CM

## 2023-04-11 DIAGNOSIS — E11.3393: ICD-10-CM

## 2023-04-11 DIAGNOSIS — H43.813: ICD-10-CM

## 2023-04-11 DIAGNOSIS — H35.373: ICD-10-CM

## 2023-04-11 DIAGNOSIS — Z79.4: ICD-10-CM

## 2023-04-11 PROCEDURE — 92134 CPTRZ OPH DX IMG PST SGM RTA: CPT

## 2023-04-11 PROCEDURE — 92014 COMPRE OPH EXAM EST PT 1/>: CPT

## 2023-04-11 PROCEDURE — 92201 OPSCPY EXTND RTA DRAW UNI/BI: CPT

## 2023-04-11 ASSESSMENT — VISUAL ACUITY
OS_SC: 20/80-2
OD_SC: 20/50-3
OS_PH: 20/50+

## 2023-04-11 ASSESSMENT — TONOMETRY
OS_IOP_MMHG: 12
OD_IOP_MMHG: 11

## 2023-05-23 RX ORDER — AMLODIPINE BESYLATE 10 MG/1
10 TABLET ORAL DAILY
COMMUNITY
Start: 2022-05-20

## 2023-05-23 RX ORDER — DONEPEZIL HYDROCHLORIDE 5 MG/1
TABLET, FILM COATED ORAL
COMMUNITY
Start: 2021-09-03

## 2023-05-23 RX ORDER — METOPROLOL TARTRATE 50 MG/1
50 TABLET, FILM COATED ORAL 2 TIMES DAILY
COMMUNITY
Start: 2022-05-19

## 2023-05-23 RX ORDER — INSULIN ASPART 100 [IU]/ML
INJECTION, SOLUTION INTRAVENOUS; SUBCUTANEOUS
COMMUNITY

## 2023-05-23 RX ORDER — ASPIRIN 325 MG
325 TABLET ORAL DAILY
COMMUNITY

## 2023-05-23 RX ORDER — MEMANTINE HYDROCHLORIDE 10 MG/1
10 TABLET ORAL 2 TIMES DAILY
COMMUNITY
Start: 2021-09-03

## 2023-05-23 RX ORDER — MINOXIDIL 10 MG/1
10 TABLET ORAL 2 TIMES DAILY
COMMUNITY
Start: 2022-05-19

## 2025-02-10 ENCOUNTER — OFFICE VISIT (OUTPATIENT)
Age: 70
End: 2025-02-10
Payer: MEDICARE

## 2025-02-10 VITALS
SYSTOLIC BLOOD PRESSURE: 136 MMHG | OXYGEN SATURATION: 96 % | DIASTOLIC BLOOD PRESSURE: 88 MMHG | HEIGHT: 71 IN | BODY MASS INDEX: 26.01 KG/M2 | HEART RATE: 86 BPM

## 2025-02-10 DIAGNOSIS — F32.A ANXIETY AND DEPRESSION: ICD-10-CM

## 2025-02-10 DIAGNOSIS — E11.42 DIABETIC POLYNEUROPATHY ASSOCIATED WITH TYPE 2 DIABETES MELLITUS (HCC): ICD-10-CM

## 2025-02-10 DIAGNOSIS — I69.993 CVA, OLD, ATAXIA: ICD-10-CM

## 2025-02-10 DIAGNOSIS — F41.9 ANXIETY AND DEPRESSION: ICD-10-CM

## 2025-02-10 DIAGNOSIS — R53.83 OTHER FATIGUE: ICD-10-CM

## 2025-02-10 DIAGNOSIS — F01.50 VASCULAR DEMENTIA WITHOUT BEHAVIORAL DISTURBANCE (HCC): Primary | ICD-10-CM

## 2025-02-10 PROCEDURE — 1160F RVW MEDS BY RX/DR IN RCRD: CPT

## 2025-02-10 PROCEDURE — 1159F MED LIST DOCD IN RCRD: CPT

## 2025-02-10 PROCEDURE — 1123F ACP DISCUSS/DSCN MKR DOCD: CPT

## 2025-02-10 PROCEDURE — 99204 OFFICE O/P NEW MOD 45 MIN: CPT

## 2025-02-10 RX ORDER — BUSPIRONE HYDROCHLORIDE 10 MG/1
10 TABLET ORAL 3 TIMES DAILY
COMMUNITY
Start: 2024-12-30 | End: 2025-06-28

## 2025-02-10 RX ORDER — MULTIVITAMIN
1 TABLET ORAL DAILY
COMMUNITY

## 2025-02-10 RX ORDER — TRAZODONE HYDROCHLORIDE 50 MG/1
50 TABLET, FILM COATED ORAL NIGHTLY
Qty: 30 TABLET | Refills: 1 | Status: SHIPPED | OUTPATIENT
Start: 2025-02-10

## 2025-02-10 RX ORDER — ATORVASTATIN CALCIUM 40 MG/1
TABLET, FILM COATED ORAL
COMMUNITY
Start: 2025-02-07

## 2025-02-10 RX ORDER — CARVEDILOL 25 MG/1
25 TABLET ORAL 2 TIMES DAILY WITH MEALS
COMMUNITY
Start: 2025-02-05

## 2025-02-10 RX ORDER — SPIRONOLACTONE 25 MG/1
25 TABLET ORAL DAILY
COMMUNITY
Start: 2024-07-30

## 2025-02-10 RX ORDER — SACUBITRIL AND VALSARTAN 97; 103 MG/1; MG/1
1 TABLET, FILM COATED ORAL 2 TIMES DAILY
COMMUNITY
Start: 2024-07-17

## 2025-02-10 RX ORDER — LEVOTHYROXINE SODIUM 137 UG/1
TABLET ORAL
COMMUNITY
Start: 2025-01-15

## 2025-02-10 RX ORDER — OXYCODONE AND ACETAMINOPHEN 7.5; 325 MG/1; MG/1
1 TABLET ORAL EVERY 6 HOURS PRN
COMMUNITY
Start: 2025-02-08

## 2025-02-10 RX ORDER — CLOPIDOGREL BISULFATE 75 MG/1
75 TABLET ORAL DAILY
COMMUNITY

## 2025-02-10 RX ORDER — VENLAFAXINE HYDROCHLORIDE 75 MG/1
75 CAPSULE, EXTENDED RELEASE ORAL DAILY
COMMUNITY
Start: 2025-02-05

## 2025-02-10 RX ORDER — DONEPEZIL HYDROCHLORIDE 10 MG/1
10 TABLET, FILM COATED ORAL NIGHTLY
Qty: 30 TABLET | Refills: 5 | Status: SHIPPED | OUTPATIENT
Start: 2025-02-10

## 2025-02-10 RX ORDER — CLONIDINE HYDROCHLORIDE 0.3 MG/1
0.3 TABLET ORAL 2 TIMES DAILY
COMMUNITY

## 2025-02-10 RX ORDER — DULAGLUTIDE 3 MG/.5ML
3 INJECTION, SOLUTION SUBCUTANEOUS WEEKLY
COMMUNITY
Start: 2025-01-15

## 2025-02-10 RX ORDER — INSULIN DEGLUDEC 100 U/ML
INJECTION, SOLUTION SUBCUTANEOUS
COMMUNITY
Start: 2025-01-30

## 2025-02-10 RX ORDER — DICLOFENAC 16.05 MG/ML
SOLUTION TOPICAL
COMMUNITY
Start: 2024-12-30

## 2025-02-10 RX ORDER — EMPAGLIFLOZIN 10 MG/1
10 TABLET, FILM COATED ORAL EVERY MORNING
COMMUNITY
Start: 2025-01-03

## 2025-02-10 ASSESSMENT — PATIENT HEALTH QUESTIONNAIRE - PHQ9
SUM OF ALL RESPONSES TO PHQ9 QUESTIONS 1 & 2: 0
1. LITTLE INTEREST OR PLEASURE IN DOING THINGS: NOT AT ALL
SUM OF ALL RESPONSES TO PHQ QUESTIONS 1-9: 0
2. FEELING DOWN, DEPRESSED OR HOPELESS: NOT AT ALL
SUM OF ALL RESPONSES TO PHQ QUESTIONS 1-9: 0

## 2025-02-10 NOTE — PROGRESS NOTES
NEUROLOGY  NEW PATIENT EVALUATION/CONSULTATION         PATIENT ID:   eDlia Kaiser  1955  69 y.o. female  [id number]     Reason for Consultation: Hx of Vascular Dementia       Previous records (physician notes, laboratory reports, and radiology reports) and imaging studies were reviewed and summarized. My recommendations will be communicated back to the patient's physician(s) via electronic medical record and/or by US mail.     Chief Complaint   Patient presents with    Neurologic Problem     Vascular Dementia       Referred by: Self    She was previously seen in the clinic by SYDNI Flores 3/23/21.  \"RECAP  She has been diagnosed with vascular dementia. 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  She has recovered from her weakness quite well.  She has residual mild speech impediment and right-sided vision field loss.  Her daughter states that her memory has been declining and she is becoming increasingly forgetful.  Her long-term memory is reasonably intact.  She is independent with all activities of  daily living. She is currently living with her son and is in a supervised situation all the time.  She does not drive.  Ambulation has been affected because of low back pain, peripheral neuropathy and knee arthritis.  She can walk some at home but outside the house she needs maximum assistance or wheelchair.Ms. Delia Kaiser has a remote history of stroke with residual right-sided peripheral vision loss and mild speech impediment.  The stroke likely has affected her memory as well and she has vascular dementia.   Recently she was hospitalized at [facility] for symptoms of difficulty with talking and unsteady gait.  She had MRI scan of the brain which did not show anything new. I am wondering if this could have been a side effect of multiple types of medications she takes  Continue aspirin and

## 2025-02-10 NOTE — PROGRESS NOTES
Chief Complaint   Patient presents with    Neurologic Problem     Vascular Dementia     Vitals:    02/10/25 1302   BP: 136/88   Pulse: 86   SpO2: 96%

## 2025-04-09 DIAGNOSIS — F32.A ANXIETY AND DEPRESSION: ICD-10-CM

## 2025-04-09 DIAGNOSIS — F41.9 ANXIETY AND DEPRESSION: ICD-10-CM

## 2025-04-10 RX ORDER — TRAZODONE HYDROCHLORIDE 50 MG/1
50 TABLET ORAL NIGHTLY
Qty: 90 TABLET | Refills: 0 | Status: SHIPPED | OUTPATIENT
Start: 2025-04-10

## 2025-08-05 DIAGNOSIS — F01.50 VASCULAR DEMENTIA WITHOUT BEHAVIORAL DISTURBANCE (HCC): ICD-10-CM

## 2025-08-05 RX ORDER — DONEPEZIL HYDROCHLORIDE 10 MG/1
10 TABLET, FILM COATED ORAL NIGHTLY
Qty: 30 TABLET | Refills: 0 | Status: SHIPPED | OUTPATIENT
Start: 2025-08-05